# Patient Record
Sex: MALE | Race: WHITE | NOT HISPANIC OR LATINO | Employment: FULL TIME | URBAN - METROPOLITAN AREA
[De-identification: names, ages, dates, MRNs, and addresses within clinical notes are randomized per-mention and may not be internally consistent; named-entity substitution may affect disease eponyms.]

---

## 2018-07-23 ENCOUNTER — OFFICE VISIT (OUTPATIENT)
Dept: FAMILY MEDICINE CLINIC | Facility: CLINIC | Age: 23
End: 2018-07-23

## 2018-07-23 VITALS
WEIGHT: 140.2 LBS | RESPIRATION RATE: 16 BRPM | SYSTOLIC BLOOD PRESSURE: 90 MMHG | HEART RATE: 82 BPM | BODY MASS INDEX: 20.76 KG/M2 | HEIGHT: 69 IN | TEMPERATURE: 98.6 F | OXYGEN SATURATION: 98 % | DIASTOLIC BLOOD PRESSURE: 70 MMHG

## 2018-07-23 DIAGNOSIS — L84 FOOT CALLUS: ICD-10-CM

## 2018-07-23 DIAGNOSIS — R23.4 PEELING SKIN: ICD-10-CM

## 2018-07-23 DIAGNOSIS — Z76.89 ENCOUNTER TO ESTABLISH CARE: ICD-10-CM

## 2018-07-23 DIAGNOSIS — J45.30 MILD PERSISTENT ASTHMA, UNSPECIFIED WHETHER COMPLICATED: Primary | ICD-10-CM

## 2018-07-23 DIAGNOSIS — Z00.00 HEALTHCARE MAINTENANCE: ICD-10-CM

## 2018-07-23 LAB
ALBUMIN SERPL-MCNC: 4.8 G/DL (ref 3.5–5.2)
ALBUMIN/GLOB SERPL: 1.5 G/DL
ALP SERPL-CCNC: 84 U/L (ref 39–117)
ALT SERPL W P-5'-P-CCNC: 17 U/L (ref 1–41)
ANION GAP SERPL CALCULATED.3IONS-SCNC: 16.3 MMOL/L
AST SERPL-CCNC: 13 U/L (ref 1–40)
BILIRUB SERPL-MCNC: 0.7 MG/DL (ref 0.1–1.2)
BUN BLD-MCNC: 10 MG/DL (ref 6–20)
BUN/CREAT SERPL: 12.2 (ref 7–25)
CALCIUM SPEC-SCNC: 10.5 MG/DL (ref 8.6–10.5)
CHLORIDE SERPL-SCNC: 98 MMOL/L (ref 98–107)
CHOLEST SERPL-MCNC: 180 MG/DL (ref 0–200)
CO2 SERPL-SCNC: 26.7 MMOL/L (ref 22–29)
CREAT BLD-MCNC: 0.82 MG/DL (ref 0.76–1.27)
ERYTHROCYTE [DISTWIDTH] IN BLOOD BY AUTOMATED COUNT: 12.6 % (ref 4.5–15)
GFR SERPL CREATININE-BSD FRML MDRD: 120 ML/MIN/1.73
GLOBULIN UR ELPH-MCNC: 3.3 GM/DL
GLUCOSE BLD-MCNC: 87 MG/DL (ref 65–99)
HCT VFR BLD AUTO: 50.6 % (ref 35–60)
HDLC SERPL-MCNC: 55 MG/DL (ref 40–60)
HGB BLD-MCNC: 17.3 G/DL (ref 13.5–18)
LDLC SERPL CALC-MCNC: 105 MG/DL (ref 0–100)
LDLC/HDLC SERPL: 1.91 {RATIO}
LYMPHOCYTES # BLD AUTO: 2.5 10*3/MM3 (ref 1.2–3.4)
LYMPHOCYTES NFR BLD AUTO: 24.5 % (ref 21–51)
MCH RBC QN AUTO: 30.4 PG (ref 26.1–33.1)
MCHC RBC AUTO-ENTMCNC: 34.2 G/DL (ref 33–37)
MCV RBC AUTO: 88.8 FL (ref 80–99)
MONOCYTES # BLD AUTO: 0.5 10*3/MM3 (ref 0.1–0.6)
MONOCYTES NFR BLD AUTO: 5 % (ref 2–9)
NEUTROPHILS # BLD AUTO: 7.3 10*3/MM3 (ref 1.4–6.5)
NEUTROPHILS NFR BLD AUTO: 70.5 % (ref 42–75)
PLATELET # BLD AUTO: 412 10*3/MM3 (ref 150–450)
PMV BLD AUTO: 8.1 FL (ref 7.1–10.5)
POTASSIUM BLD-SCNC: 4.2 MMOL/L (ref 3.5–5.2)
PROT SERPL-MCNC: 8.1 G/DL (ref 6–8.5)
RBC # BLD AUTO: 5.7 10*6/MM3 (ref 4–6)
SODIUM BLD-SCNC: 141 MMOL/L (ref 136–145)
TRIGL SERPL-MCNC: 100 MG/DL (ref 0–150)
VLDLC SERPL-MCNC: 20 MG/DL (ref 5–40)
WBC NRBC COR # BLD: 10.3 10*3/MM3 (ref 4.5–10)

## 2018-07-23 PROCEDURE — 85025 COMPLETE CBC W/AUTO DIFF WBC: CPT | Performed by: NURSE PRACTITIONER

## 2018-07-23 PROCEDURE — 80061 LIPID PANEL: CPT | Performed by: NURSE PRACTITIONER

## 2018-07-23 PROCEDURE — 80053 COMPREHEN METABOLIC PANEL: CPT | Performed by: NURSE PRACTITIONER

## 2018-07-23 PROCEDURE — 36415 COLL VENOUS BLD VENIPUNCTURE: CPT | Performed by: NURSE PRACTITIONER

## 2018-07-23 PROCEDURE — 99203 OFFICE O/P NEW LOW 30 MIN: CPT | Performed by: NURSE PRACTITIONER

## 2018-07-23 RX ORDER — ALBUTEROL SULFATE 90 UG/1
2 AEROSOL, METERED RESPIRATORY (INHALATION) EVERY 4 HOURS PRN
Qty: 1 INHALER | Refills: 6 | Status: SHIPPED | OUTPATIENT
Start: 2018-07-23 | End: 2022-03-29 | Stop reason: SDUPTHER

## 2018-07-23 RX ORDER — MONTELUKAST SODIUM 10 MG/1
10 TABLET ORAL NIGHTLY
Qty: 30 TABLET | Refills: 11 | Status: SHIPPED | OUTPATIENT
Start: 2018-07-23 | End: 2022-03-29

## 2018-07-23 RX ORDER — CETIRIZINE HYDROCHLORIDE 10 MG/1
10 TABLET ORAL
COMMUNITY

## 2018-07-23 RX ORDER — ALBUTEROL SULFATE 90 UG/1
2 AEROSOL, METERED RESPIRATORY (INHALATION)
COMMUNITY
Start: 2012-09-04 | End: 2018-07-23 | Stop reason: SDUPTHER

## 2018-07-23 RX ORDER — CLOTRIMAZOLE AND BETAMETHASONE DIPROPIONATE 10; .64 MG/G; MG/G
CREAM TOPICAL EVERY 12 HOURS SCHEDULED
Qty: 15 G | Refills: 0 | Status: SHIPPED | OUTPATIENT
Start: 2018-07-23 | End: 2022-03-29

## 2018-07-24 ENCOUNTER — TELEPHONE (OUTPATIENT)
Dept: FAMILY MEDICINE CLINIC | Facility: CLINIC | Age: 23
End: 2018-07-24

## 2019-11-20 ENCOUNTER — APPOINTMENT (OUTPATIENT)
Dept: CT IMAGING | Facility: HOSPITAL | Age: 24
End: 2019-11-20

## 2019-11-20 ENCOUNTER — APPOINTMENT (OUTPATIENT)
Dept: GENERAL RADIOLOGY | Facility: HOSPITAL | Age: 24
End: 2019-11-20

## 2019-11-20 ENCOUNTER — HOSPITAL ENCOUNTER (EMERGENCY)
Facility: HOSPITAL | Age: 24
Discharge: HOME | End: 2019-11-20
Attending: EMERGENCY MEDICINE | Admitting: EMERGENCY MEDICINE

## 2019-11-20 VITALS
HEART RATE: 89 BPM | WEIGHT: 150 LBS | SYSTOLIC BLOOD PRESSURE: 132 MMHG | TEMPERATURE: 98 F | RESPIRATION RATE: 16 BRPM | HEIGHT: 70 IN | BODY MASS INDEX: 21.47 KG/M2 | OXYGEN SATURATION: 98 % | DIASTOLIC BLOOD PRESSURE: 72 MMHG

## 2019-11-20 DIAGNOSIS — K52.9 COLITIS: ICD-10-CM

## 2019-11-20 DIAGNOSIS — E86.0 DEHYDRATION: ICD-10-CM

## 2019-11-20 DIAGNOSIS — R19.7 DIARRHEA, UNSPECIFIED TYPE: ICD-10-CM

## 2019-11-20 DIAGNOSIS — B34.0 ADENOVIRUS INFECTION: Primary | ICD-10-CM

## 2019-11-20 LAB
ALBUMIN SERPL-MCNC: 4.3 G/DL (ref 3.5–5.2)
ALBUMIN/GLOB SERPL: 1 G/DL
ALP SERPL-CCNC: 75 U/L (ref 39–117)
ALT SERPL W P-5'-P-CCNC: 10 U/L (ref 1–41)
ANION GAP SERPL CALCULATED.3IONS-SCNC: 12.8 MMOL/L (ref 5–15)
AST SERPL-CCNC: 15 U/L (ref 1–40)
B PARAPERT DNA SPEC QL NAA+PROBE: NOT DETECTED
B PERT DNA SPEC QL NAA+PROBE: NOT DETECTED
BASOPHILS # BLD AUTO: 0.03 10*3/MM3 (ref 0–0.2)
BASOPHILS NFR BLD AUTO: 0.4 % (ref 0–1.5)
BILIRUB SERPL-MCNC: 0.4 MG/DL (ref 0.2–1.2)
BILIRUB UR QL STRIP: NEGATIVE
BUN BLD-MCNC: 10 MG/DL (ref 6–20)
BUN/CREAT SERPL: 12 (ref 7–25)
C PNEUM DNA NPH QL NAA+NON-PROBE: NOT DETECTED
CALCIUM SPEC-SCNC: 9.7 MG/DL (ref 8.6–10.5)
CHLORIDE SERPL-SCNC: 98 MMOL/L (ref 98–107)
CK SERPL-CCNC: 40 U/L (ref 20–200)
CLARITY UR: CLEAR
CO2 SERPL-SCNC: 26.2 MMOL/L (ref 22–29)
COLOR UR: ABNORMAL
CREAT BLD-MCNC: 0.83 MG/DL (ref 0.76–1.27)
DEPRECATED RDW RBC AUTO: 39.2 FL (ref 37–54)
EOSINOPHIL # BLD AUTO: 0.2 10*3/MM3 (ref 0–0.4)
EOSINOPHIL NFR BLD AUTO: 2.7 % (ref 0.3–6.2)
ERYTHROCYTE [DISTWIDTH] IN BLOOD BY AUTOMATED COUNT: 12 % (ref 12.3–15.4)
FLUAV H1 2009 PAND RNA NPH QL NAA+PROBE: NOT DETECTED
FLUAV H1 HA GENE NPH QL NAA+PROBE: NOT DETECTED
FLUAV H3 RNA NPH QL NAA+PROBE: NOT DETECTED
FLUAV SUBTYP SPEC NAA+PROBE: NOT DETECTED
FLUBV RNA ISLT QL NAA+PROBE: NOT DETECTED
GFR SERPL CREATININE-BSD FRML MDRD: 117 ML/MIN/1.73
GLOBULIN UR ELPH-MCNC: 4.1 GM/DL
GLUCOSE BLD-MCNC: 102 MG/DL (ref 65–99)
GLUCOSE UR STRIP-MCNC: NEGATIVE MG/DL
HADV DNA SPEC NAA+PROBE: DETECTED
HCOV 229E RNA SPEC QL NAA+PROBE: NOT DETECTED
HCOV HKU1 RNA SPEC QL NAA+PROBE: NOT DETECTED
HCOV NL63 RNA SPEC QL NAA+PROBE: NOT DETECTED
HCOV OC43 RNA SPEC QL NAA+PROBE: NOT DETECTED
HCT VFR BLD AUTO: 49.2 % (ref 37.5–51)
HGB BLD-MCNC: 17.3 G/DL (ref 13–17.7)
HGB UR QL STRIP.AUTO: NEGATIVE
HMPV RNA NPH QL NAA+NON-PROBE: NOT DETECTED
HPIV1 RNA SPEC QL NAA+PROBE: NOT DETECTED
HPIV2 RNA SPEC QL NAA+PROBE: NOT DETECTED
HPIV3 RNA NPH QL NAA+PROBE: NOT DETECTED
HPIV4 P GENE NPH QL NAA+PROBE: NOT DETECTED
IMM GRANULOCYTES # BLD AUTO: 0.03 10*3/MM3 (ref 0–0.05)
IMM GRANULOCYTES NFR BLD AUTO: 0.4 % (ref 0–0.5)
KETONES UR QL STRIP: ABNORMAL
LEUKOCYTE ESTERASE UR QL STRIP.AUTO: NEGATIVE
LIPASE SERPL-CCNC: 10 U/L (ref 13–60)
LYMPHOCYTES # BLD AUTO: 1.8 10*3/MM3 (ref 0.7–3.1)
LYMPHOCYTES NFR BLD AUTO: 24.4 % (ref 19.6–45.3)
M PNEUMO IGG SER IA-ACNC: NOT DETECTED
MCH RBC QN AUTO: 31.5 PG (ref 26.6–33)
MCHC RBC AUTO-ENTMCNC: 35.2 G/DL (ref 31.5–35.7)
MCV RBC AUTO: 89.6 FL (ref 79–97)
MONOCYTES # BLD AUTO: 1.03 10*3/MM3 (ref 0.1–0.9)
MONOCYTES NFR BLD AUTO: 14 % (ref 5–12)
NEUTROPHILS # BLD AUTO: 4.29 10*3/MM3 (ref 1.7–7)
NEUTROPHILS NFR BLD AUTO: 58.1 % (ref 42.7–76)
NITRITE UR QL STRIP: NEGATIVE
NRBC BLD AUTO-RTO: 0 /100 WBC (ref 0–0.2)
PH UR STRIP.AUTO: 6 [PH] (ref 5–8)
PLATELET # BLD AUTO: 365 10*3/MM3 (ref 140–450)
PMV BLD AUTO: 10.1 FL (ref 6–12)
POTASSIUM BLD-SCNC: 3.8 MMOL/L (ref 3.5–5.2)
PROCALCITONIN SERPL-MCNC: 0.06 NG/ML (ref 0.1–0.25)
PROT SERPL-MCNC: 8.4 G/DL (ref 6–8.5)
PROT UR QL STRIP: ABNORMAL
RBC # BLD AUTO: 5.49 10*6/MM3 (ref 4.14–5.8)
RHINOVIRUS RNA SPEC NAA+PROBE: NOT DETECTED
RSV RNA NPH QL NAA+NON-PROBE: NOT DETECTED
S PYO AG THROAT QL: NEGATIVE
SODIUM BLD-SCNC: 137 MMOL/L (ref 136–145)
SP GR UR STRIP: >=1.03 (ref 1–1.03)
UROBILINOGEN UR QL STRIP: ABNORMAL
WBC NRBC COR # BLD: 7.38 10*3/MM3 (ref 3.4–10.8)

## 2019-11-20 PROCEDURE — 87081 CULTURE SCREEN ONLY: CPT | Performed by: EMERGENCY MEDICINE

## 2019-11-20 PROCEDURE — 82550 ASSAY OF CK (CPK): CPT | Performed by: EMERGENCY MEDICINE

## 2019-11-20 PROCEDURE — 80053 COMPREHEN METABOLIC PANEL: CPT | Performed by: EMERGENCY MEDICINE

## 2019-11-20 PROCEDURE — 0100U HC BIOFIRE FILMARRAY RESP PANEL 2: CPT | Performed by: EMERGENCY MEDICINE

## 2019-11-20 PROCEDURE — 99283 EMERGENCY DEPT VISIT LOW MDM: CPT

## 2019-11-20 PROCEDURE — 25010000002 IOPAMIDOL 61 % SOLUTION: Performed by: EMERGENCY MEDICINE

## 2019-11-20 PROCEDURE — 74022 RADEX COMPL AQT ABD SERIES: CPT

## 2019-11-20 PROCEDURE — 83690 ASSAY OF LIPASE: CPT | Performed by: EMERGENCY MEDICINE

## 2019-11-20 PROCEDURE — 85025 COMPLETE CBC W/AUTO DIFF WBC: CPT | Performed by: EMERGENCY MEDICINE

## 2019-11-20 PROCEDURE — 74177 CT ABD & PELVIS W/CONTRAST: CPT

## 2019-11-20 PROCEDURE — 84145 PROCALCITONIN (PCT): CPT | Performed by: EMERGENCY MEDICINE

## 2019-11-20 PROCEDURE — 81003 URINALYSIS AUTO W/O SCOPE: CPT | Performed by: EMERGENCY MEDICINE

## 2019-11-20 PROCEDURE — 87880 STREP A ASSAY W/OPTIC: CPT | Performed by: EMERGENCY MEDICINE

## 2019-11-20 RX ORDER — ONDANSETRON 4 MG/1
4 TABLET, FILM COATED ORAL EVERY 6 HOURS
Qty: 12 TABLET | Refills: 0 | Status: SHIPPED | OUTPATIENT
Start: 2019-11-20 | End: 2022-03-29

## 2019-11-20 RX ORDER — METRONIDAZOLE 500 MG/1
500 TABLET ORAL 3 TIMES DAILY
Qty: 21 TABLET | Refills: 0 | Status: SHIPPED | OUTPATIENT
Start: 2019-11-20 | End: 2022-03-29

## 2019-11-20 RX ORDER — CIPROFLOXACIN 500 MG/1
500 TABLET, FILM COATED ORAL 2 TIMES DAILY
Qty: 14 TABLET | Refills: 0 | Status: SHIPPED | OUTPATIENT
Start: 2019-11-20 | End: 2022-03-29

## 2019-11-20 RX ADMIN — SODIUM CHLORIDE 1000 ML: 9 INJECTION, SOLUTION INTRAVENOUS at 16:39

## 2019-11-20 RX ADMIN — IOPAMIDOL 95 ML: 612 INJECTION, SOLUTION INTRAVENOUS at 17:43

## 2019-11-22 LAB — BACTERIA SPEC AEROBE CULT: NORMAL

## 2022-03-29 ENCOUNTER — OFFICE VISIT (OUTPATIENT)
Dept: INTERNAL MEDICINE | Facility: CLINIC | Age: 27
End: 2022-03-29

## 2022-03-29 VITALS
BODY MASS INDEX: 22.76 KG/M2 | SYSTOLIC BLOOD PRESSURE: 104 MMHG | DIASTOLIC BLOOD PRESSURE: 68 MMHG | WEIGHT: 159 LBS | OXYGEN SATURATION: 99 % | HEIGHT: 70 IN | HEART RATE: 126 BPM

## 2022-03-29 DIAGNOSIS — J45.20 MILD INTERMITTENT ASTHMA WITHOUT COMPLICATION: ICD-10-CM

## 2022-03-29 DIAGNOSIS — R06.83 SNORING: ICD-10-CM

## 2022-03-29 DIAGNOSIS — Z00.00 HEALTHCARE MAINTENANCE: Primary | ICD-10-CM

## 2022-03-29 PROBLEM — J30.9 ALLERGIC RHINITIS: Status: ACTIVE | Noted: 2022-03-29

## 2022-03-29 PROCEDURE — 99385 PREV VISIT NEW AGE 18-39: CPT | Performed by: NURSE PRACTITIONER

## 2022-03-29 PROCEDURE — 99203 OFFICE O/P NEW LOW 30 MIN: CPT | Performed by: NURSE PRACTITIONER

## 2022-03-29 RX ORDER — DEXAMETHASONE 4 MG/1
1 TABLET ORAL
Qty: 12 G | Refills: 11 | Status: SHIPPED | OUTPATIENT
Start: 2022-03-29

## 2022-03-29 RX ORDER — ALBUTEROL SULFATE 90 UG/1
2 AEROSOL, METERED RESPIRATORY (INHALATION) EVERY 4 HOURS PRN
Qty: 8 G | Refills: 3 | Status: SHIPPED | OUTPATIENT
Start: 2022-03-29

## 2022-06-15 ENCOUNTER — APPOINTMENT (OUTPATIENT)
Dept: GENERAL RADIOLOGY | Facility: HOSPITAL | Age: 27
End: 2022-06-15

## 2022-06-15 ENCOUNTER — HOSPITAL ENCOUNTER (EMERGENCY)
Facility: HOSPITAL | Age: 27
Discharge: HOME | End: 2022-06-16
Attending: EMERGENCY MEDICINE | Admitting: EMERGENCY MEDICINE

## 2022-06-15 DIAGNOSIS — J45.41 MODERATE PERSISTENT ASTHMA WITH ACUTE EXACERBATION: Primary | ICD-10-CM

## 2022-06-15 LAB
ALBUMIN SERPL-MCNC: 4 G/DL (ref 3.5–5.2)
ALBUMIN/GLOB SERPL: 1.3 G/DL
ALP SERPL-CCNC: 107 U/L (ref 39–117)
ALT SERPL W P-5'-P-CCNC: 12 U/L (ref 1–41)
ANION GAP SERPL CALCULATED.3IONS-SCNC: 12.1 MMOL/L (ref 5–15)
AST SERPL-CCNC: 14 U/L (ref 1–40)
BASOPHILS # BLD AUTO: 0.02 10*3/MM3 (ref 0–0.2)
BASOPHILS NFR BLD AUTO: 0.2 % (ref 0–1.5)
BILIRUB SERPL-MCNC: 0.3 MG/DL (ref 0–1.2)
BUN SERPL-MCNC: 10 MG/DL (ref 6–20)
BUN/CREAT SERPL: 14.1 (ref 7–25)
CALCIUM SPEC-SCNC: 9.1 MG/DL (ref 8.6–10.5)
CHLORIDE SERPL-SCNC: 105 MMOL/L (ref 98–107)
CO2 SERPL-SCNC: 24.9 MMOL/L (ref 22–29)
CREAT SERPL-MCNC: 0.71 MG/DL (ref 0.76–1.27)
DEPRECATED RDW RBC AUTO: 38.2 FL (ref 37–54)
EGFRCR SERPLBLD CKD-EPI 2021: 131.4 ML/MIN/1.73
EOSINOPHIL # BLD AUTO: 0.44 10*3/MM3 (ref 0–0.4)
EOSINOPHIL NFR BLD AUTO: 3.8 % (ref 0.3–6.2)
ERYTHROCYTE [DISTWIDTH] IN BLOOD BY AUTOMATED COUNT: 11.9 % (ref 12.3–15.4)
GLOBULIN UR ELPH-MCNC: 3.2 GM/DL
GLUCOSE SERPL-MCNC: 93 MG/DL (ref 65–99)
HCT VFR BLD AUTO: 42.8 % (ref 37.5–51)
HGB BLD-MCNC: 14.7 G/DL (ref 13–17.7)
IMM GRANULOCYTES # BLD AUTO: 0.03 10*3/MM3 (ref 0–0.05)
IMM GRANULOCYTES NFR BLD AUTO: 0.3 % (ref 0–0.5)
LYMPHOCYTES # BLD AUTO: 1.75 10*3/MM3 (ref 0.7–3.1)
LYMPHOCYTES NFR BLD AUTO: 15.1 % (ref 19.6–45.3)
MCH RBC QN AUTO: 30.6 PG (ref 26.6–33)
MCHC RBC AUTO-ENTMCNC: 34.3 G/DL (ref 31.5–35.7)
MCV RBC AUTO: 89.2 FL (ref 79–97)
MONOCYTES # BLD AUTO: 0.9 10*3/MM3 (ref 0.1–0.9)
MONOCYTES NFR BLD AUTO: 7.8 % (ref 5–12)
NEUTROPHILS NFR BLD AUTO: 72.8 % (ref 42.7–76)
NEUTROPHILS NFR BLD AUTO: 8.42 10*3/MM3 (ref 1.7–7)
NRBC BLD AUTO-RTO: 0 /100 WBC (ref 0–0.2)
PLATELET # BLD AUTO: 346 10*3/MM3 (ref 140–450)
PMV BLD AUTO: 9.9 FL (ref 6–12)
POTASSIUM SERPL-SCNC: 3.6 MMOL/L (ref 3.5–5.2)
PROT SERPL-MCNC: 7.2 G/DL (ref 6–8.5)
RBC # BLD AUTO: 4.8 10*6/MM3 (ref 4.14–5.8)
SARS-COV-2 RNA RESP QL NAA+PROBE: NOT DETECTED
SODIUM SERPL-SCNC: 142 MMOL/L (ref 136–145)
WBC NRBC COR # BLD: 11.56 10*3/MM3 (ref 3.4–10.8)

## 2022-06-15 PROCEDURE — 94664 DEMO&/EVAL PT USE INHALER: CPT

## 2022-06-15 PROCEDURE — 63710000001 PREDNISONE PER 1 MG: Performed by: EMERGENCY MEDICINE

## 2022-06-15 PROCEDURE — 94799 UNLISTED PULMONARY SVC/PX: CPT

## 2022-06-15 PROCEDURE — 84484 ASSAY OF TROPONIN QUANT: CPT | Performed by: EMERGENCY MEDICINE

## 2022-06-15 PROCEDURE — 71045 X-RAY EXAM CHEST 1 VIEW: CPT

## 2022-06-15 PROCEDURE — 84145 PROCALCITONIN (PCT): CPT | Performed by: EMERGENCY MEDICINE

## 2022-06-15 PROCEDURE — 85025 COMPLETE CBC W/AUTO DIFF WBC: CPT | Performed by: EMERGENCY MEDICINE

## 2022-06-15 PROCEDURE — 96361 HYDRATE IV INFUSION ADD-ON: CPT

## 2022-06-15 PROCEDURE — 94640 AIRWAY INHALATION TREATMENT: CPT

## 2022-06-15 PROCEDURE — 93005 ELECTROCARDIOGRAM TRACING: CPT | Performed by: EMERGENCY MEDICINE

## 2022-06-15 PROCEDURE — 99284 EMERGENCY DEPT VISIT MOD MDM: CPT

## 2022-06-15 PROCEDURE — 25010000002 MAGNESIUM SULFATE 2 GM/50ML SOLUTION: Performed by: EMERGENCY MEDICINE

## 2022-06-15 PROCEDURE — U0003 INFECTIOUS AGENT DETECTION BY NUCLEIC ACID (DNA OR RNA); SEVERE ACUTE RESPIRATORY SYNDROME CORONAVIRUS 2 (SARS-COV-2) (CORONAVIRUS DISEASE [COVID-19]), AMPLIFIED PROBE TECHNIQUE, MAKING USE OF HIGH THROUGHPUT TECHNOLOGIES AS DESCRIBED BY CMS-2020-01-R: HCPCS | Performed by: EMERGENCY MEDICINE

## 2022-06-15 PROCEDURE — 80053 COMPREHEN METABOLIC PANEL: CPT | Performed by: EMERGENCY MEDICINE

## 2022-06-15 PROCEDURE — 96365 THER/PROPH/DIAG IV INF INIT: CPT

## 2022-06-15 PROCEDURE — 93010 ELECTROCARDIOGRAM REPORT: CPT | Performed by: INTERNAL MEDICINE

## 2022-06-15 RX ORDER — ALBUTEROL SULFATE 2.5 MG/3ML
2.5 SOLUTION RESPIRATORY (INHALATION)
Status: DISPENSED | OUTPATIENT
Start: 2022-06-15 | End: 2022-06-15

## 2022-06-15 RX ORDER — MAGNESIUM SULFATE HEPTAHYDRATE 40 MG/ML
2 INJECTION, SOLUTION INTRAVENOUS ONCE
Status: COMPLETED | OUTPATIENT
Start: 2022-06-15 | End: 2022-06-15

## 2022-06-15 RX ORDER — PREDNISONE 20 MG/1
60 TABLET ORAL ONCE
Status: COMPLETED | OUTPATIENT
Start: 2022-06-15 | End: 2022-06-15

## 2022-06-15 RX ORDER — IPRATROPIUM BROMIDE AND ALBUTEROL SULFATE 2.5; .5 MG/3ML; MG/3ML
3 SOLUTION RESPIRATORY (INHALATION) ONCE
Status: COMPLETED | OUTPATIENT
Start: 2022-06-15 | End: 2022-06-15

## 2022-06-15 RX ADMIN — IPRATROPIUM BROMIDE AND ALBUTEROL SULFATE 3 ML: .5; 3 SOLUTION RESPIRATORY (INHALATION) at 23:17

## 2022-06-15 RX ADMIN — SODIUM CHLORIDE 1000 ML: 9 INJECTION, SOLUTION INTRAVENOUS at 22:55

## 2022-06-15 RX ADMIN — PREDNISONE 60 MG: 20 TABLET ORAL at 22:59

## 2022-06-15 RX ADMIN — ALBUTEROL SULFATE 2.5 MG: 2.5 SOLUTION RESPIRATORY (INHALATION) at 23:27

## 2022-06-15 RX ADMIN — ALBUTEROL SULFATE 2.5 MG: 2.5 SOLUTION RESPIRATORY (INHALATION) at 23:46

## 2022-06-15 RX ADMIN — MAGNESIUM SULFATE HEPTAHYDRATE 2 G: 2 INJECTION, SOLUTION INTRAVENOUS at 22:57

## 2022-06-16 VITALS
HEART RATE: 117 BPM | TEMPERATURE: 98.1 F | DIASTOLIC BLOOD PRESSURE: 73 MMHG | OXYGEN SATURATION: 98 % | RESPIRATION RATE: 18 BRPM | SYSTOLIC BLOOD PRESSURE: 119 MMHG

## 2022-06-16 LAB
PROCALCITONIN SERPL-MCNC: 0.09 NG/ML (ref 0–0.25)
QT INTERVAL: 318 MS
TROPONIN T SERPL-MCNC: <0.01 NG/ML (ref 0–0.03)

## 2022-06-16 RX ORDER — PREDNISONE 20 MG/1
40 TABLET ORAL DAILY
Qty: 10 TABLET | Refills: 0 | Status: SHIPPED | OUTPATIENT
Start: 2022-06-16 | End: 2022-06-21

## 2023-09-19 ENCOUNTER — HOSPITAL ENCOUNTER (EMERGENCY)
Facility: HOSPITAL | Age: 28
Discharge: LEFT BEFORE DISCHARGE | End: 2023-09-19
Attending: EMERGENCY MEDICINE | Admitting: EMERGENCY MEDICINE

## 2023-09-19 VITALS
TEMPERATURE: 98.1 F | HEIGHT: 70 IN | HEART RATE: 96 BPM | SYSTOLIC BLOOD PRESSURE: 139 MMHG | BODY MASS INDEX: 21.47 KG/M2 | WEIGHT: 150 LBS | OXYGEN SATURATION: 96 % | DIASTOLIC BLOOD PRESSURE: 76 MMHG | RESPIRATION RATE: 18 BRPM

## 2023-09-19 DIAGNOSIS — Z53.29 REFUSAL OF CARE BY PATIENT: Primary | ICD-10-CM
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
## 2023-09-19 DIAGNOSIS — M65.10 INFECTIOUS TENOSYNOVITIS: ICD-10-CM

## 2023-09-19 DIAGNOSIS — S61.459A DOG BITE OF HAND, UNSPECIFIED LATERALITY, INITIAL ENCOUNTER: ICD-10-CM

## 2023-09-19 PROCEDURE — 99282 EMERGENCY DEPT VISIT SF MDM: CPT

## 2023-09-19 RX ORDER — ALBUTEROL SULFATE 90 UG/1
2 AEROSOL, METERED RESPIRATORY (INHALATION) EVERY 4 HOURS PRN
Qty: 8 G | Refills: 0 | Status: SHIPPED | OUTPATIENT
Start: 2023-09-19

## 2023-09-19 RX ORDER — DOXYCYCLINE 100 MG/1
100 CAPSULE ORAL 2 TIMES DAILY
Qty: 28 CAPSULE | Refills: 0 | Status: SHIPPED | OUTPATIENT
Start: 2023-09-19 | End: 2023-10-03

## 2023-09-19 RX ORDER — HYDROCODONE BITARTRATE AND ACETAMINOPHEN 5; 325 MG/1; MG/1
1 TABLET ORAL EVERY 6 HOURS PRN
Qty: 10 TABLET | Refills: 0 | Status: SHIPPED | OUTPATIENT
Start: 2023-09-19

## 2025-03-10 ENCOUNTER — APPOINTMENT (OUTPATIENT)
Dept: GENERAL RADIOLOGY | Facility: HOSPITAL | Age: 30
End: 2025-03-10
Payer: COMMERCIAL

## 2025-03-10 ENCOUNTER — APPOINTMENT (OUTPATIENT)
Dept: CT IMAGING | Facility: HOSPITAL | Age: 30
End: 2025-03-10
Payer: COMMERCIAL

## 2025-03-10 ENCOUNTER — HOSPITAL ENCOUNTER (OUTPATIENT)
Facility: HOSPITAL | Age: 30
Setting detail: OBSERVATION
Discharge: HOME | End: 2025-03-13
Attending: EMERGENCY MEDICINE | Admitting: STUDENT IN AN ORGANIZED HEALTH CARE EDUCATION/TRAINING PROGRAM
Payer: COMMERCIAL

## 2025-03-10 DIAGNOSIS — R19.7 NAUSEA VOMITING AND DIARRHEA: ICD-10-CM

## 2025-03-10 DIAGNOSIS — R11.2 NAUSEA VOMITING AND DIARRHEA: ICD-10-CM

## 2025-03-10 DIAGNOSIS — J18.9 MULTIFOCAL PNEUMONIA: Primary | ICD-10-CM

## 2025-03-10 PROBLEM — A41.9 SEPSIS: Status: ACTIVE | Noted: 2025-03-10

## 2025-03-10 PROBLEM — E87.6 HYPOKALEMIA: Status: ACTIVE | Noted: 2025-03-10

## 2025-03-10 PROBLEM — E87.1 HYPONATREMIA: Status: ACTIVE | Noted: 2025-03-10

## 2025-03-10 LAB
ADV 40+41 DNA STL QL NAA+NON-PROBE: NOT DETECTED
ALBUMIN SERPL-MCNC: 4.2 G/DL (ref 3.5–5.2)
ALBUMIN/GLOB SERPL: 1 G/DL
ALP SERPL-CCNC: 91 U/L (ref 39–117)
ALT SERPL W P-5'-P-CCNC: 27 U/L (ref 1–41)
ANION GAP SERPL CALCULATED.3IONS-SCNC: 17.7 MMOL/L (ref 5–15)
AST SERPL-CCNC: 20 U/L (ref 1–40)
ASTRO TYP 1-8 RNA STL QL NAA+NON-PROBE: NOT DETECTED
B PARAPERT DNA SPEC QL NAA+PROBE: NOT DETECTED
B PERT DNA SPEC QL NAA+PROBE: NOT DETECTED
BACTERIA UR QL AUTO: ABNORMAL /HPF
BASOPHILS # BLD AUTO: 0.03 10*3/MM3 (ref 0–0.2)
BASOPHILS NFR BLD AUTO: 0.2 % (ref 0–1.5)
BILIRUB SERPL-MCNC: 1 MG/DL (ref 0–1.2)
BILIRUB UR QL STRIP: NEGATIVE
BUN SERPL-MCNC: 6 MG/DL (ref 6–20)
BUN/CREAT SERPL: 7.4 (ref 7–25)
C CAYETANENSIS DNA STL QL NAA+NON-PROBE: NOT DETECTED
C COLI+JEJ+UPSA DNA STL QL NAA+NON-PROBE: NOT DETECTED
C PNEUM DNA NPH QL NAA+NON-PROBE: NOT DETECTED
CALCIUM SPEC-SCNC: 9.4 MG/DL (ref 8.6–10.5)
CHLORIDE SERPL-SCNC: 93 MMOL/L (ref 98–107)
CLARITY UR: CLEAR
CO2 SERPL-SCNC: 21.3 MMOL/L (ref 22–29)
COLOR UR: ABNORMAL
CREAT SERPL-MCNC: 0.81 MG/DL (ref 0.76–1.27)
CRYPTOSP DNA STL QL NAA+NON-PROBE: NOT DETECTED
D-LACTATE SERPL-SCNC: 1.7 MMOL/L (ref 0.5–2)
DEPRECATED RDW RBC AUTO: 39.9 FL (ref 37–54)
E HISTOLYT DNA STL QL NAA+NON-PROBE: NOT DETECTED
EAEC PAA PLAS AGGR+AATA ST NAA+NON-PRB: NOT DETECTED
EC STX1+STX2 GENES STL QL NAA+NON-PROBE: NOT DETECTED
EGFRCR SERPLBLD CKD-EPI 2021: 123.9 ML/MIN/1.73
EOSINOPHIL # BLD AUTO: 0.01 10*3/MM3 (ref 0–0.4)
EOSINOPHIL NFR BLD AUTO: 0.1 % (ref 0.3–6.2)
EPEC EAE GENE STL QL NAA+NON-PROBE: NOT DETECTED
ERYTHROCYTE [DISTWIDTH] IN BLOOD BY AUTOMATED COUNT: 12.1 % (ref 12.3–15.4)
ETEC LTA+ST1A+ST1B TOX ST NAA+NON-PROBE: NOT DETECTED
FLUAV SUBTYP SPEC NAA+PROBE: NOT DETECTED
FLUBV RNA ISLT QL NAA+PROBE: NOT DETECTED
G LAMBLIA DNA STL QL NAA+NON-PROBE: NOT DETECTED
GLOBULIN UR ELPH-MCNC: 4.1 GM/DL
GLUCOSE SERPL-MCNC: 102 MG/DL (ref 65–99)
GLUCOSE UR STRIP-MCNC: NEGATIVE MG/DL
HADV DNA SPEC NAA+PROBE: NOT DETECTED
HCOV 229E RNA SPEC QL NAA+PROBE: NOT DETECTED
HCOV HKU1 RNA SPEC QL NAA+PROBE: NOT DETECTED
HCOV NL63 RNA SPEC QL NAA+PROBE: NOT DETECTED
HCOV OC43 RNA SPEC QL NAA+PROBE: NOT DETECTED
HCT VFR BLD AUTO: 45.2 % (ref 37.5–51)
HGB BLD-MCNC: 15.2 G/DL (ref 13–17.7)
HGB UR QL STRIP.AUTO: NEGATIVE
HMPV RNA NPH QL NAA+NON-PROBE: NOT DETECTED
HOLD SPECIMEN: NORMAL
HOLD SPECIMEN: NORMAL
HPIV1 RNA ISLT QL NAA+PROBE: NOT DETECTED
HPIV2 RNA SPEC QL NAA+PROBE: NOT DETECTED
HPIV3 RNA NPH QL NAA+PROBE: NOT DETECTED
HPIV4 P GENE NPH QL NAA+PROBE: NOT DETECTED
HYALINE CASTS UR QL AUTO: ABNORMAL /LPF
IMM GRANULOCYTES # BLD AUTO: 0.2 10*3/MM3 (ref 0–0.05)
IMM GRANULOCYTES NFR BLD AUTO: 1 % (ref 0–0.5)
KETONES UR QL STRIP: ABNORMAL
L PNEUMO1 AG UR QL IA: NEGATIVE
LEUKOCYTE ESTERASE UR QL STRIP.AUTO: NEGATIVE
LIPASE SERPL-CCNC: 10 U/L (ref 13–60)
LYMPHOCYTES # BLD AUTO: 1.36 10*3/MM3 (ref 0.7–3.1)
LYMPHOCYTES NFR BLD AUTO: 7 % (ref 19.6–45.3)
M PNEUMO IGG SER IA-ACNC: NOT DETECTED
MCH RBC QN AUTO: 29.9 PG (ref 26.6–33)
MCHC RBC AUTO-ENTMCNC: 33.6 G/DL (ref 31.5–35.7)
MCV RBC AUTO: 88.8 FL (ref 79–97)
MONOCYTES # BLD AUTO: 2.65 10*3/MM3 (ref 0.1–0.9)
MONOCYTES NFR BLD AUTO: 13.7 % (ref 5–12)
NEUTROPHILS NFR BLD AUTO: 15.16 10*3/MM3 (ref 1.7–7)
NEUTROPHILS NFR BLD AUTO: 78 % (ref 42.7–76)
NITRITE UR QL STRIP: NEGATIVE
NOROVIRUS GI+II RNA STL QL NAA+NON-PROBE: NOT DETECTED
NRBC BLD AUTO-RTO: 0 /100 WBC (ref 0–0.2)
P SHIGELLOIDES DNA STL QL NAA+NON-PROBE: NOT DETECTED
PH UR STRIP.AUTO: 5.5 [PH] (ref 5–8)
PLATELET # BLD AUTO: 471 10*3/MM3 (ref 140–450)
PMV BLD AUTO: 11 FL (ref 6–12)
POTASSIUM SERPL-SCNC: 3.2 MMOL/L (ref 3.5–5.2)
PROCALCITONIN SERPL-MCNC: 0.1 NG/ML (ref 0–0.25)
PROT SERPL-MCNC: 8.3 G/DL (ref 6–8.5)
PROT UR QL STRIP: ABNORMAL
RBC # BLD AUTO: 5.09 10*6/MM3 (ref 4.14–5.8)
RBC # UR STRIP: ABNORMAL /HPF
REF LAB TEST METHOD: ABNORMAL
RHINOVIRUS RNA SPEC NAA+PROBE: NOT DETECTED
RSV RNA NPH QL NAA+NON-PROBE: NOT DETECTED
RVA RNA STL QL NAA+NON-PROBE: NOT DETECTED
S ENT+BONG DNA STL QL NAA+NON-PROBE: NOT DETECTED
S PNEUM AG SPEC QL LA: NEGATIVE
SAPO I+II+IV+V RNA STL QL NAA+NON-PROBE: NOT DETECTED
SARS-COV-2 RNA NPH QL NAA+NON-PROBE: NOT DETECTED
SHIGELLA SP+EIEC IPAH ST NAA+NON-PROBE: NOT DETECTED
SODIUM SERPL-SCNC: 132 MMOL/L (ref 136–145)
SP GR UR STRIP: 1.02 (ref 1–1.03)
SQUAMOUS #/AREA URNS HPF: ABNORMAL /HPF
UROBILINOGEN UR QL STRIP: ABNORMAL
V CHOL+PARA+VUL DNA STL QL NAA+NON-PROBE: NOT DETECTED
V CHOLERAE DNA STL QL NAA+NON-PROBE: NOT DETECTED
WBC # UR STRIP: ABNORMAL /HPF
WBC NRBC COR # BLD AUTO: 19.41 10*3/MM3 (ref 3.4–10.8)
WHOLE BLOOD HOLD COAG: NORMAL
WHOLE BLOOD HOLD SPECIMEN: NORMAL
Y ENTEROCOL DNA STL QL NAA+NON-PROBE: NOT DETECTED

## 2025-03-10 PROCEDURE — 80053 COMPREHEN METABOLIC PANEL: CPT | Performed by: EMERGENCY MEDICINE

## 2025-03-10 PROCEDURE — 96375 TX/PRO/DX INJ NEW DRUG ADDON: CPT

## 2025-03-10 PROCEDURE — 99285 EMERGENCY DEPT VISIT HI MDM: CPT

## 2025-03-10 PROCEDURE — 87507 IADNA-DNA/RNA PROBE TQ 12-25: CPT | Performed by: STUDENT IN AN ORGANIZED HEALTH CARE EDUCATION/TRAINING PROGRAM

## 2025-03-10 PROCEDURE — 71046 X-RAY EXAM CHEST 2 VIEWS: CPT

## 2025-03-10 PROCEDURE — 83690 ASSAY OF LIPASE: CPT | Performed by: EMERGENCY MEDICINE

## 2025-03-10 PROCEDURE — 83605 ASSAY OF LACTIC ACID: CPT | Performed by: EMERGENCY MEDICINE

## 2025-03-10 PROCEDURE — 96361 HYDRATE IV INFUSION ADD-ON: CPT

## 2025-03-10 PROCEDURE — 81001 URINALYSIS AUTO W/SCOPE: CPT

## 2025-03-10 PROCEDURE — 25510000001 IOPAMIDOL 61 % SOLUTION: Performed by: EMERGENCY MEDICINE

## 2025-03-10 PROCEDURE — 25810000003 LACTATED RINGERS PER 1000 ML: Performed by: STUDENT IN AN ORGANIZED HEALTH CARE EDUCATION/TRAINING PROGRAM

## 2025-03-10 PROCEDURE — G0378 HOSPITAL OBSERVATION PER HR: HCPCS

## 2025-03-10 PROCEDURE — 84145 PROCALCITONIN (PCT): CPT | Performed by: PHYSICIAN ASSISTANT

## 2025-03-10 PROCEDURE — 85025 COMPLETE CBC W/AUTO DIFF WBC: CPT

## 2025-03-10 PROCEDURE — 0202U NFCT DS 22 TRGT SARS-COV-2: CPT | Performed by: PHYSICIAN ASSISTANT

## 2025-03-10 PROCEDURE — 87040 BLOOD CULTURE FOR BACTERIA: CPT

## 2025-03-10 PROCEDURE — 25010000002 CEFTRIAXONE PER 250 MG: Performed by: PHYSICIAN ASSISTANT

## 2025-03-10 PROCEDURE — 74177 CT ABD & PELVIS W/CONTRAST: CPT

## 2025-03-10 PROCEDURE — 87449 NOS EACH ORGANISM AG IA: CPT | Performed by: STUDENT IN AN ORGANIZED HEALTH CARE EDUCATION/TRAINING PROGRAM

## 2025-03-10 PROCEDURE — 96365 THER/PROPH/DIAG IV INF INIT: CPT

## 2025-03-10 PROCEDURE — 36415 COLL VENOUS BLD VENIPUNCTURE: CPT

## 2025-03-10 PROCEDURE — 25810000003 LACTATED RINGERS SOLUTION: Performed by: PHYSICIAN ASSISTANT

## 2025-03-10 PROCEDURE — 25010000002 ONDANSETRON PER 1 MG: Performed by: PHYSICIAN ASSISTANT

## 2025-03-10 RX ORDER — BISACODYL 10 MG
10 SUPPOSITORY, RECTAL RECTAL DAILY PRN
Status: DISCONTINUED | OUTPATIENT
Start: 2025-03-10 | End: 2025-03-12

## 2025-03-10 RX ORDER — ONDANSETRON 4 MG/1
4 TABLET, ORALLY DISINTEGRATING ORAL EVERY 6 HOURS PRN
Status: DISCONTINUED | OUTPATIENT
Start: 2025-03-10 | End: 2025-03-13 | Stop reason: HOSPADM

## 2025-03-10 RX ORDER — ONDANSETRON 2 MG/ML
4 INJECTION INTRAMUSCULAR; INTRAVENOUS ONCE
Status: COMPLETED | OUTPATIENT
Start: 2025-03-10 | End: 2025-03-10

## 2025-03-10 RX ORDER — ACETAMINOPHEN 325 MG/1
650 TABLET ORAL EVERY 4 HOURS PRN
Status: DISCONTINUED | OUTPATIENT
Start: 2025-03-10 | End: 2025-03-13 | Stop reason: HOSPADM

## 2025-03-10 RX ORDER — ACETAMINOPHEN 500 MG
1000 TABLET ORAL ONCE
Status: COMPLETED | OUTPATIENT
Start: 2025-03-10 | End: 2025-03-10

## 2025-03-10 RX ORDER — SODIUM CHLORIDE 0.9 % (FLUSH) 0.9 %
10 SYRINGE (ML) INJECTION AS NEEDED
Status: DISCONTINUED | OUTPATIENT
Start: 2025-03-10 | End: 2025-03-13 | Stop reason: HOSPADM

## 2025-03-10 RX ORDER — POLYETHYLENE GLYCOL 3350 17 G/17G
17 POWDER, FOR SOLUTION ORAL DAILY PRN
Status: DISCONTINUED | OUTPATIENT
Start: 2025-03-10 | End: 2025-03-12

## 2025-03-10 RX ORDER — POTASSIUM CHLORIDE 1500 MG/1
40 TABLET, EXTENDED RELEASE ORAL EVERY 4 HOURS
Status: COMPLETED | OUTPATIENT
Start: 2025-03-10 | End: 2025-03-10

## 2025-03-10 RX ORDER — IPRATROPIUM BROMIDE AND ALBUTEROL SULFATE 2.5; .5 MG/3ML; MG/3ML
3 SOLUTION RESPIRATORY (INHALATION)
Status: DISCONTINUED | OUTPATIENT
Start: 2025-03-10 | End: 2025-03-13 | Stop reason: HOSPADM

## 2025-03-10 RX ORDER — BISACODYL 5 MG/1
5 TABLET, DELAYED RELEASE ORAL DAILY PRN
Status: DISCONTINUED | OUTPATIENT
Start: 2025-03-10 | End: 2025-03-12

## 2025-03-10 RX ORDER — AMOXICILLIN 250 MG
2 CAPSULE ORAL 2 TIMES DAILY PRN
Status: DISCONTINUED | OUTPATIENT
Start: 2025-03-10 | End: 2025-03-12

## 2025-03-10 RX ORDER — NITROGLYCERIN 0.4 MG/1
0.4 TABLET SUBLINGUAL
Status: DISCONTINUED | OUTPATIENT
Start: 2025-03-10 | End: 2025-03-13 | Stop reason: HOSPADM

## 2025-03-10 RX ORDER — SODIUM CHLORIDE, SODIUM LACTATE, POTASSIUM CHLORIDE, CALCIUM CHLORIDE 600; 310; 30; 20 MG/100ML; MG/100ML; MG/100ML; MG/100ML
100 INJECTION, SOLUTION INTRAVENOUS CONTINUOUS
Status: DISCONTINUED | OUTPATIENT
Start: 2025-03-10 | End: 2025-03-13 | Stop reason: HOSPADM

## 2025-03-10 RX ORDER — ALBUTEROL SULFATE 90 UG/1
2 INHALANT RESPIRATORY (INHALATION) EVERY 4 HOURS PRN
Status: DISCONTINUED | OUTPATIENT
Start: 2025-03-10 | End: 2025-03-13 | Stop reason: HOSPADM

## 2025-03-10 RX ORDER — GUAIFENESIN 600 MG/1
600 TABLET, EXTENDED RELEASE ORAL EVERY 12 HOURS SCHEDULED
Status: DISCONTINUED | OUTPATIENT
Start: 2025-03-10 | End: 2025-03-13 | Stop reason: HOSPADM

## 2025-03-10 RX ORDER — ONDANSETRON 2 MG/ML
4 INJECTION INTRAMUSCULAR; INTRAVENOUS EVERY 6 HOURS PRN
Status: DISCONTINUED | OUTPATIENT
Start: 2025-03-10 | End: 2025-03-13 | Stop reason: HOSPADM

## 2025-03-10 RX ORDER — IOPAMIDOL 612 MG/ML
100 INJECTION, SOLUTION INTRAVASCULAR
Status: COMPLETED | OUTPATIENT
Start: 2025-03-10 | End: 2025-03-10

## 2025-03-10 RX ORDER — CETIRIZINE HYDROCHLORIDE 10 MG/1
10 TABLET ORAL DAILY
Status: DISCONTINUED | OUTPATIENT
Start: 2025-03-10 | End: 2025-03-13 | Stop reason: HOSPADM

## 2025-03-10 RX ORDER — BENZONATATE 100 MG/1
100 CAPSULE ORAL 3 TIMES DAILY PRN
Status: DISCONTINUED | OUTPATIENT
Start: 2025-03-10 | End: 2025-03-13 | Stop reason: HOSPADM

## 2025-03-10 RX ADMIN — POTASSIUM CHLORIDE 40 MEQ: 20 TABLET, EXTENDED RELEASE ORAL at 16:46

## 2025-03-10 RX ADMIN — SODIUM CHLORIDE, SODIUM LACTATE, POTASSIUM CHLORIDE, CALCIUM CHLORIDE 100 ML/HR: 20; 30; 600; 310 INJECTION, SOLUTION INTRAVENOUS at 14:06

## 2025-03-10 RX ADMIN — SODIUM CHLORIDE, SODIUM LACTATE, POTASSIUM CHLORIDE, CALCIUM CHLORIDE 1000 ML: 20; 30; 600; 310 INJECTION, SOLUTION INTRAVENOUS at 09:41

## 2025-03-10 RX ADMIN — POTASSIUM CHLORIDE 40 MEQ: 20 TABLET, EXTENDED RELEASE ORAL at 21:01

## 2025-03-10 RX ADMIN — CEFTRIAXONE SODIUM 1000 MG: 1 INJECTION, POWDER, FOR SOLUTION INTRAMUSCULAR; INTRAVENOUS at 11:56

## 2025-03-10 RX ADMIN — DOXYCYCLINE 100 MG: 100 INJECTION, POWDER, LYOPHILIZED, FOR SOLUTION INTRAVENOUS at 16:52

## 2025-03-10 RX ADMIN — ONDANSETRON 4 MG: 2 INJECTION, SOLUTION INTRAMUSCULAR; INTRAVENOUS at 09:43

## 2025-03-10 RX ADMIN — GUAIFENESIN 600 MG: 600 TABLET, MULTILAYER, EXTENDED RELEASE ORAL at 16:52

## 2025-03-10 RX ADMIN — BENZONATATE 100 MG: 100 CAPSULE ORAL at 23:21

## 2025-03-10 RX ADMIN — ACETAMINOPHEN 1000 MG: 500 TABLET, FILM COATED ORAL at 10:39

## 2025-03-10 RX ADMIN — IOPAMIDOL 85 ML: 612 INJECTION, SOLUTION INTRAVENOUS at 09:59

## 2025-03-10 NOTE — ED NOTES
Nursing report ED to floor  [facility]  29 y.o.  male    HPI :  HPI  Stated Reason for Visit: diarrhea, h/a, sore throat x1.5 week; dx with flu last week  History Obtained From: patient    Chief Complaint  Chief Complaint   Patient presents with    Influenza    Diarrhea    Headache       Admitting doctor:   Jonh Javier DO    Admitting diagnosis:   The primary encounter diagnosis was Multifocal pneumonia. A diagnosis of Nausea vomiting and diarrhea was also pertinent to this visit.    Code status:   Current Code Status       Date Active Code Status Order ID Comments User Context       3/10/2025 1214 CPR (Attempt to Resuscitate) [id number]  Jonh Javier DO ED        Question Answer    Code Status (Patient has no pulse and is not breathing) CPR (Attempt to Resuscitate)    Medical Interventions (Patient has pulse or is breathing) Full    Level Of Support Discussed With Patient                    Allergies:   Amoxicillin-pot clavulanate, Augmentin [amoxicillin-pot clavulanate], and Penicillins    Isolation:   No active isolations    Intake and Output  No intake or output data in the 24 hours ending 03/10/25 1349    Weight:   There were no vitals filed for this visit.    Most recent vitals:   Vitals:    03/10/25 0849 03/10/25 0851 03/10/25 0934 03/10/25 1157   BP:  117/73 111/72    Pulse: (!) 123  110    Resp: 18      Temp: (!) 101.2 °F (38.4 °C)   99.1 °F (37.3 °C)   TempSrc: Tympanic   Oral   SpO2: 93%  96%        Active LDAs/IV Access:   Lines, Drains & Airways       Active LDAs       Name Placement date Placement time Site Days    Peripheral IV 03/10/25 0938 Anterior;Left Forearm 03/10/25  0938  Forearm  less than 1                    Labs (abnormal labs have a star):   Labs Reviewed   COMPREHENSIVE METABOLIC PANEL - Abnormal; Notable for the following components:       Result Value    Glucose 102 (*)     Sodium 132 (*)     Potassium 3.2 (*)     Chloride 93 (*)     CO2 21.3 (*)     Anion Gap 17.7 (*)     All other  components within normal limits    Narrative:     GFR Categories in Chronic Kidney Disease (CKD)      GFR Category          GFR (mL/min/1.73)    Interpretation  G1                     90 or greater         Normal or high (1)  G2                      60-89                Mild decrease (1)  G3a                   45-59                Mild to moderate decrease  G3b                   30-44                Moderate to severe decrease  G4                    15-29                Severe decrease  G5                    14 or less           Kidney failure          (1)In the absence of evidence of kidney disease, neither GFR category G1 or G2 fulfill the criteria for CKD.    eGFR calculation 2021 CKD-EPI creatinine equation, which does not include race as a factor   LIPASE - Abnormal; Notable for the following components:    Lipase 10 (*)     All other components within normal limits   URINALYSIS W/ MICROSCOPIC IF INDICATED (NO CULTURE) - Abnormal; Notable for the following components:    Color, UA Dark Yellow (*)     Ketones, UA >=160 mg/dL (4+) (*)     Protein, UA 30 mg/dL (1+) (*)     All other components within normal limits   CBC WITH AUTO DIFFERENTIAL - Abnormal; Notable for the following components:    WBC 19.41 (*)     RDW 12.1 (*)     Platelets 471 (*)     Neutrophil % 78.0 (*)     Lymphocyte % 7.0 (*)     Monocyte % 13.7 (*)     Eosinophil % 0.1 (*)     Immature Grans % 1.0 (*)     Neutrophils, Absolute 15.16 (*)     Monocytes, Absolute 2.65 (*)     Immature Grans, Absolute 0.20 (*)     All other components within normal limits   URINALYSIS, MICROSCOPIC ONLY - Abnormal; Notable for the following components:    WBC, UA 3-5 (*)     All other components within normal limits   RESPIRATORY PANEL PCR W/ COVID-19 (SARS-COV-2), NP SWAB IN UTM/VTP, 2 HR TAT - Normal    Narrative:     In the setting of a positive respiratory panel with a viral infection PLUS a negative procalcitonin without other underlying concern for bacterial  "infection, consider observing off antibiotics or discontinuation of antibiotics and continue supportive care. If the respiratory panel is positive for atypical bacterial infection (Bordetella pertussis, Chlamydophila pneumoniae, or Mycoplasma pneumoniae), consider antibiotic de-escalation to target atypical bacterial infection.   LACTIC ACID, PLASMA - Normal   PROCALCITONIN - Normal    Narrative:     As a Marker for Sepsis (Non-Neonates):    1. <0.5 ng/mL represents a low risk of severe sepsis and/or septic shock.  2. >2 ng/mL represents a high risk of severe sepsis and/or septic shock.    As a Marker for Lower Respiratory Tract Infections that require antibiotic therapy:    PCT on Admission    Antibiotic Therapy       6-12 Hrs later    >0.5                Strongly Recommended  >0.25 - <0.5        Recommended   0.1 - 0.25          Discouraged              Remeasure/reassess PCT  <0.1                Strongly Discouraged     Remeasure/reassess PCT    As 28 day mortality risk marker: \"Change in Procalcitonin Result\" (>80% or <=80%) if Day 0 (or Day 1) and Day 4 values are available. Refer to http://www.[employer]-pct-calculator.com    Change in PCT <=80%  A decrease of PCT levels below or equal to 80% defines a positive change in PCT test result representing a higher risk for 28-day all-cause mortality of patients diagnosed with severe sepsis for septic shock.    Change in PCT >80%  A decrease of PCT levels of more than 80% defines a negative change in PCT result representing a lower risk for 28-day all-cause mortality of patients diagnosed with severe sepsis or septic shock.      BLOOD CULTURE   BLOOD CULTURE   GASTROINTESTINAL PANEL, PCR (PREFERRED) DOES NOT INCLUDE CDIFF   STREP PNEUMO AG, URINE OR CSF   LEGIONELLA ANTIGEN, URINE   RAINBOW DRAW    Narrative:     The following orders were created for panel order [place] Draw.  Procedure                               Abnormality         Status                   "   ---------                               -----------         ------                     Green Top (Gel)[[id number]]                                  Final result               Lavender Top[[id number]]                                     Final result               Gold Top - SST[[id number]]                                   Final result               Light Blue Top[[id number]]                                   Final result                 Please view results for these tests on the individual orders.   CBC AND DIFFERENTIAL    Narrative:     The following orders were created for panel order CBC & Differential.  Procedure                               Abnormality         Status                     ---------                               -----------         ------                     CBC Auto Differential[[id number]]        Abnormal            Final result                 Please view results for these tests on the individual orders.   GREEN TOP   LAVENDER TOP   GOLD TOP - SST   LIGHT BLUE TOP       EKG:   No orders to display       Meds given in ED:   Medications   sodium chloride 0.9 % flush 10 mL (has no administration in time range)   sodium chloride 0.9 % flush 10 mL (has no administration in time range)   lactated ringers infusion (has no administration in time range)   nitroglycerin (NITROSTAT) SL tablet 0.4 mg (has no administration in time range)   acetaminophen (TYLENOL) tablet 650 mg (has no administration in time range)   sennosides-docusate (PERICOLACE) 8.6-50 MG per tablet 2 tablet (has no administration in time range)     And   polyethylene glycol (MIRALAX) packet 17 g (has no administration in time range)     And   bisacodyl (DULCOLAX) EC tablet 5 mg (has no administration in time range)     And   bisacodyl (DULCOLAX) suppository 10 mg (has no administration in time range)   ondansetron ODT (ZOFRAN-ODT) disintegrating tablet 4 mg (has no administration in time range)     Or   ondansetron (ZOFRAN) injection 4  mg (has no administration in time range)   Potassium Replacement - Follow Nurse / BPA Driven Protocol (has no administration in time range)   Magnesium Standard Dose Replacement - Follow Nurse / BPA Driven Protocol (has no administration in time range)   Phosphorus Replacement - Follow Nurse / BPA Driven Protocol (has no administration in time range)   Calcium Replacement - Follow Nurse / BPA Driven Protocol (has no administration in time range)   lactated ringers bolus 1,000 mL (0 mL Intravenous Stopped 3/10/25 1153)   ondansetron (ZOFRAN) injection 4 mg (4 mg Intravenous Given 3/10/25 0943)   iopamidol (ISOVUE-300) 61 % injection 100 mL (85 mL Intravenous Given by Other 3/10/25 0959)   acetaminophen (TYLENOL) tablet 1,000 mg (1,000 mg Oral Given 3/10/25 1039)   cefTRIAXone (ROCEPHIN) 1,000 mg in sodium chloride 0.9 % 100 mL MBP (0 mg Intravenous Stopped 3/10/25 1240)       Imaging results:  XR Chest 2 View  Result Date: 3/10/2025  Ill-defined multifocal bilateral lung opacities. Suspect multifocal pneumonia. Can be followed to resolution.  This report was finalized on 3/10/2025 10:40 AM by Dr. Benjamin Villeda M.D on Workstation: [id number]      CT Abdomen Pelvis With Contrast  Result Date: 3/10/2025   1. Partially imaged multifocal lung opacities in each lung base, consistent with diffuse pneumonia. 2. Small volume fluid in the lumen of the nondilated cecum and ascending colon likely reflects the clinical symptom of diarrhea.  This report was finalized on 3/10/2025 10:34 AM by Juan C Arnett MD on Workstation: [id number]        Ambulatory status:   - ad lexus    Social issues:   Social History     Socioeconomic History    Marital status: Single   Tobacco Use    Smoking status: Every Day     Current packs/day: 0.50     Average packs/day: 0.5 packs/day for 11.2 years (5.6 ttl pk-yrs)     Types: Cigars, Cigarettes     Start date: 1/1/2014    Smokeless tobacco: Former     Types: Chew   Vaping Use    Vaping status:  Never Used   Substance and Sexual Activity    Alcohol use: Not Currently    Drug use: Yes     Types: Marijuana    Sexual activity: Yes     Partners: Female       Peripheral Neurovascular       Neuro Cognitive       Learning       Respiratory       Abdominal Pain       Pain Assessments  Pain (Adult)  (0-10) Pain Rating: Rest: 7  Pain Location: head  Response to Pain Interventions: nonverbal indicators present    NIH Stroke Scale       Francine Cordero RN  03/10/25 13:49 EDT

## 2025-03-10 NOTE — H&P
Patient Name:  Juancarlos Willingham  YOB: 1995  MRN:  [id number]  Admit Date:  3/10/2025  Patient Care Team:  Lindsay Marroquin APRN as PCP - General (Nurse Practitioner)      Subjective   History Present Illness     Chief Complaint   Patient presents with    Influenza    Diarrhea    Headache       Mr. Willingham is a 29 y.o. male with a history of asthma, allergic rhinitis and recent treatment for influenza that presents to [facility] complaining of cough, congestion, worsening dyspnea, nausea, vomiting and diarrhea over the past week prior to arrival.  He states that he had similar symptoms around 2 weeks ago, was diagnosed with influenza and was treated with Tamiflu, which subsequently led to improvement in symptoms.  However, roughly 1 week after that, symptoms came back and have been constant and progressive in nature since onset.  Aside from recent influenza infection, he denies recollection of other possible inciting factors.  He endorses associated decreased oral intake due to GI symptoms.  Also, endorses subjective fever and chills at home, intermittent headache, myalgias and fatigue.  Denies additional acute complaints unless otherwise noted.  Upon arrival, patient found to be febrile, tachycardic and with leukocytosis.  Imaging obtained and showed evidence suggestive of multifocal pneumonia.  He was given fluid bolus and empiric antibiotics in the emergency room, he is being admitted for further evaluation and management.    Personal History     Past Medical History:   Diagnosis Date    Allergic     Asthma      No past surgical history on file.  Family History   Problem Relation Age of Onset    Multiple sclerosis Mother     No Known Problems Father     Lung cancer Maternal Grandfather     Dementia Paternal Grandmother      Social History     Tobacco Use    Smoking status: Every Day     Current packs/day: 0.50     Average packs/day: 0.5 packs/day for 11.2 years (5.6 ttl  pk-yrs)     Types: Cigars, Cigarettes     Start date: 1/1/2014    Smokeless tobacco: Former     Types: Chew   Vaping Use    Vaping status: Never Used   Substance Use Topics    Alcohol use: Not Currently    Drug use: Yes     Types: Marijuana     No current facility-administered medications on file prior to encounter.     Current Outpatient Medications on File Prior to Encounter   Medication Sig Dispense Refill    albuterol sulfate  (90 Base) MCG/ACT inhaler Inhale 2 puffs Every 4 (Four) Hours As Needed for Wheezing. 8 g 0    cetirizine (zyrTEC) 10 MG tablet Take 1 tablet by mouth Daily.      [DISCONTINUED] albuterol (PROVENTIL) (2.5 MG/3ML) 0.083% nebulizer solution Take 2.5 mg by nebulization Every 4 (Four) Hours As Needed for Wheezing. 3 mL 0    [DISCONTINUED] albuterol sulfate  (90 Base) MCG/ACT inhaler Inhale 2 puffs Every 4 (Four) Hours As Needed for Wheezing. 8.5 g 0    [DISCONTINUED] benzonatate (TESSALON) 200 MG capsule Take 1 capsule by mouth 3 (Three) Times a Day As Needed for Cough. 21 capsule 0    [DISCONTINUED] brompheniramine-pseudoephedrine-DM 30-2-10 MG/5ML syrup Take 10 mL by mouth At Night As Needed for Congestion or Cough. 118 mL 0    [DISCONTINUED] brompheniramine-pseudoephedrine-DM 30-2-10 MG/5ML syrup Take 10 mL by mouth At Night As Needed for Congestion or Cough. 118 mL 0    [DISCONTINUED] fluticasone (Flovent HFA) 110 MCG/ACT inhaler Inhale 1 puff 2 (Two) Times a Day. 12 g 11    [DISCONTINUED] Fluticasone-Umeclidin-Vilant (Trelegy Ellipta) 100-62.5-25 MCG/ACT inhaler Inhale 1 puff Daily.      [DISCONTINUED] HYDROcodone-acetaminophen (NORCO) 5-325 MG per tablet Take 1 tablet by mouth Every 6 (Six) Hours As Needed for Moderate Pain. 10 tablet 0     Allergies   Allergen Reactions    Amoxicillin-Pot Clavulanate Swelling    Augmentin [Amoxicillin-Pot Clavulanate] Hives    Penicillins Swelling       Objective    Objective     Vital Signs  Temp:  [99.1 °F (37.3 °C)-101.2 °F (38.4 °C)]  99.1 °F (37.3 °C)  Heart Rate:  [110-123] 110  Resp:  [18] 18  BP: (111-117)/(72-73) 111/72  SpO2:  [93 %-96 %] 96 %  on   ;   Device (Oxygen Therapy): room air  There is no height or weight on file to calculate BMI.    Physical Exam  Vitals and nursing note reviewed.   Constitutional:       General: He is awake. He is not in acute distress.  Cardiovascular:      Rate and Rhythm: Tachycardia present.      Pulses: Normal pulses.      Heart sounds: Normal heart sounds.   Pulmonary:      Effort: Pulmonary effort is normal. No respiratory distress.      Breath sounds: Decreased breath sounds present. No wheezing.   Abdominal:      Palpations: Abdomen is soft.      Tenderness: There is no abdominal tenderness.   Skin:     General: Skin is warm and dry.   Neurological:      Mental Status: He is alert.   Psychiatric:         Behavior: Behavior is cooperative.         Results Review:  I reviewed the patient's new clinical results.  I reviewed the patient's new imaging results and agree with the interpretation.  I reviewed the patient's other test results and agree with the interpretation  I personally viewed and interpreted the patient's EKG/Telemetry data  Discussed with ED provider.    Lab Results (last 24 hours)       Procedure Component Value Units Date/Time    CBC & Differential [[id number]]  (Abnormal) Collected: 03/10/25 0900    Specimen: Blood from Arm, Right Updated: 03/10/25 0919    Narrative:      The following orders were created for panel order CBC & Differential.  Procedure                               Abnormality         Status                     ---------                               -----------         ------                     CBC Auto Differential[[id number]]        Abnormal            Final result                 Please view results for these tests on the individual orders.    Comprehensive Metabolic Panel [[id number]]  (Abnormal) Collected: 03/10/25 0900    Specimen: Blood Updated: 03/10/25 0954      Glucose 102 mg/dL      BUN 6 mg/dL      Creatinine 0.81 mg/dL      Sodium 132 mmol/L      Potassium 3.2 mmol/L      Chloride 93 mmol/L      CO2 21.3 mmol/L      Calcium 9.4 mg/dL      Total Protein 8.3 g/dL      Albumin 4.2 g/dL      ALT (SGPT) 27 U/L      AST (SGOT) 20 U/L      Alkaline Phosphatase 91 U/L      Total Bilirubin 1.0 mg/dL      Globulin 4.1 gm/dL      A/G Ratio 1.0 g/dL      BUN/Creatinine Ratio 7.4     Anion Gap 17.7 mmol/L      eGFR 123.9 mL/min/1.73     Narrative:      GFR Categories in Chronic Kidney Disease (CKD)      GFR Category          GFR (mL/min/1.73)    Interpretation  G1                     90 or greater         Normal or high (1)  G2                      60-89                Mild decrease (1)  G3a                   45-59                Mild to moderate decrease  G3b                   30-44                Moderate to severe decrease  G4                    15-29                Severe decrease  G5                    14 or less           Kidney failure          (1)In the absence of evidence of kidney disease, neither GFR category G1 or G2 fulfill the criteria for CKD.    eGFR calculation 2021 CKD-EPI creatinine equation, which does not include race as a factor    Lipase [[id number]]  (Abnormal) Collected: 03/10/25 0900    Specimen: Blood Updated: 03/10/25 0954     Lipase 10 U/L     CBC Auto Differential [[id number]]  (Abnormal) Collected: 03/10/25 0900    Specimen: Blood from Arm, Right Updated: 03/10/25 0919     WBC 19.41 10*3/mm3      RBC 5.09 10*6/mm3      Hemoglobin 15.2 g/dL      Hematocrit 45.2 %      MCV 88.8 fL      MCH 29.9 pg      MCHC 33.6 g/dL      RDW 12.1 %      RDW-SD 39.9 fl      MPV 11.0 fL      Platelets 471 10*3/mm3      Neutrophil % 78.0 %      Lymphocyte % 7.0 %      Monocyte % 13.7 %      Eosinophil % 0.1 %      Basophil % 0.2 %      Immature Grans % 1.0 %      Neutrophils, Absolute 15.16 10*3/mm3      Lymphocytes, Absolute 1.36 10*3/mm3      Monocytes, Absolute 2.65  "10*3/mm3      Eosinophils, Absolute 0.01 10*3/mm3      Basophils, Absolute 0.03 10*3/mm3      Immature Grans, Absolute 0.20 10*3/mm3      nRBC 0.0 /100 WBC     Lactic Acid, Plasma [[id number]]  (Normal) Collected: 03/10/25 0900    Specimen: Blood from Arm, Right Updated: 03/10/25 0945     Lactate 1.7 mmol/L     Blood Culture - Blood, Arm, Right [[id number]] Collected: 03/10/25 0900    Specimen: Blood from Arm, Right Updated: 03/10/25 0910    Procalcitonin [[id number]]  (Normal) Collected: 03/10/25 0900    Specimen: Blood Updated: 03/10/25 1149     Procalcitonin 0.10 ng/mL     Narrative:      As a Marker for Sepsis (Non-Neonates):    1. <0.5 ng/mL represents a low risk of severe sepsis and/or septic shock.  2. >2 ng/mL represents a high risk of severe sepsis and/or septic shock.    As a Marker for Lower Respiratory Tract Infections that require antibiotic therapy:    PCT on Admission    Antibiotic Therapy       6-12 Hrs later    >0.5                Strongly Recommended  >0.25 - <0.5        Recommended   0.1 - 0.25          Discouraged              Remeasure/reassess PCT  <0.1                Strongly Discouraged     Remeasure/reassess PCT    As 28 day mortality risk marker: \"Change in Procalcitonin Result\" (>80% or <=80%) if Day 0 (or Day 1) and Day 4 values are available. Refer to http://www.[facility]-pct-calculator.com    Change in PCT <=80%  A decrease of PCT levels below or equal to 80% defines a positive change in PCT test result representing a higher risk for 28-day all-cause mortality of patients diagnosed with severe sepsis for septic shock.    Change in PCT >80%  A decrease of PCT levels of more than 80% defines a negative change in PCT result representing a lower risk for 28-day all-cause mortality of patients diagnosed with severe sepsis or septic shock.       Blood Culture - Blood, Arm, Left [[id number]] Collected: 03/10/25 0904    Specimen: Blood from Arm, Left Updated: 03/10/25 0911    Urinalysis With " Microscopic If Indicated (No Culture) - Urine, Clean Catch [[id number]]  (Abnormal) Collected: 03/10/25 0905    Specimen: Urine, Clean Catch Updated: 03/10/25 0917     Color, UA Dark Yellow     Appearance, UA Clear     pH, UA 5.5     Specific Gravity, UA 1.024     Glucose, UA Negative     Ketones, UA >=160 mg/dL (4+)     Bilirubin, UA Negative     Blood, UA Negative     Protein, UA 30 mg/dL (1+)     Leuk Esterase, UA Negative     Nitrite, UA Negative     Urobilinogen, UA 1.0 E.U./dL    Urinalysis, Microscopic Only - Urine, Clean Catch [[id number]]  (Abnormal) Collected: 03/10/25 0905    Specimen: Urine, Clean Catch Updated: 03/10/25 0917     RBC, UA 0-2 /HPF      WBC, UA 3-5 /HPF      Bacteria, UA None Seen /HPF      Squamous Epithelial Cells, UA 0-2 /HPF      Hyaline Casts, UA 0-2 /LPF      Methodology Automated Microscopy    Respiratory Panel PCR w/COVID-19(SARS-CoV-2) LAY/JESSA/JORDAN/PAD/COR/SUE In-House, NP Swab in UTM/VTM, 2 HR TAT - Swab, Nasopharynx [[id number]] Collected: 03/10/25 1040    Specimen: Swab from Nasopharynx Updated: 03/10/25 1044            Imaging Results (Last 24 Hours)       Procedure Component Value Units Date/Time    XR Chest 2 View [[id number]] Collected: 03/10/25 1039     Updated: 03/10/25 1043    Narrative:      XR CHEST 2 VW-        INDICATION: Cough, influenza     COMPARISON: Chest radiograph Malissa 15, 2022     TECHNIQUE: 2 view chest     FINDINGS:      Ill-defined multifocal bilateral lung opacities. No effusions. Stable  mediastinum. Heart is normal in size.       Impression:      Ill-defined multifocal bilateral lung opacities. Suspect multifocal  pneumonia. Can be followed to resolution.     This report was finalized on 3/10/2025 10:40 AM by Dr. Benjamin Villeda M.D on Workstation: [id number]       CT Abdomen Pelvis With Contrast [[id number]] Collected: 03/10/25 1027     Updated: 03/10/25 1037    Narrative:      CT ABDOMEN PELVIS W CONTRAST-     DATE OF EXAM: 3/10/2025 9:46 AM      INDICATION: vomiting, diarrhea,  x4 days leukocytosis.     COMPARISON: Chest radiographs 3/10/2025 and 6/15/2022. CT abdomen pelvis  11/20/2019.     TECHNIQUE: Multiple contiguous axial images were acquired through the  abdomen and pelvis following the intravenous administration of 85 mL of  Isovue-300. Reformatted coronal and sagittal sequences were also  reviewed. Radiation dose reduction techniques were utilized, including  automated exposure control and exposure modulation based on body size.     FINDINGS:  Respiratory motion artifact mild limits evaluation of the lung bases.  Multifocal patchy and nodular consolidation in each lung base with  scattered groundglass opacities and interstitial thickening, likely  pneumonia.     The liver, gallbladder, spleen, pancreas, adrenal glands, and kidneys  are unremarkable. The urinary bladder is nondistended. Urinary bladder  wall thickening is likely accentuated by underdistention.     Minimal colorectal stool. Small volume fluid in the lumen of the  nondilated cecum and ascending colon likely reflects the clinical  symptom of diarrhea. No bowel obstruction or significant bowel wall  thickening. The appendix is normal.     No free fluid in the abdomen or pelvis. No free intraperitoneal air. No  pathologically enlarged lymph nodes in the abdomen or pelvis.  Transitional lumbosacral vertebral anatomy. No acute osseous abnormality  or concerning osseous lesion.       Impression:         1. Partially imaged multifocal lung opacities in each lung base,  consistent with diffuse pneumonia.  2. Small volume fluid in the lumen of the nondilated cecum and ascending  colon likely reflects the clinical symptom of diarrhea.     This report was finalized on 3/10/2025 10:34 AM by Juan C Arnett MD on  Workstation: [id number]                   No orders to display        Assessment/Plan     Active Hospital Problems    Diagnosis  POA    **Multifocal pneumonia [J18.9]  Yes    Sepsis  [A41.9]  Yes    Diarrhea [R19.7]  Yes    Nausea with vomiting [R11.2]  Yes    Hyponatremia [E87.1]  Yes    Hypokalemia [E87.6]  Yes    Allergic rhinitis [J30.9]  Yes    Asthma [J45.909]  Yes      Resolved Hospital Problems   No resolved problems to display.     Mr. Willingham is a 29 y.o. male with a history of asthma, allergic rhinitis and recent treatment for influenza that presents to [facility] complaining of cough, congestion, worsening dyspnea, nausea, vomiting and diarrhea over the past week prior to arrival.     SIRS 3/4 on arrival with fever, tachycardia and leukocytosis. Imaging showing findings of multifocal pneumonia. Patient recently diagnosed and treated for influenza, concern for post-influenza bacterial pneumonia. Blood cultures ordered, s/p fluid bolus in ED and empiric antibiotics. RVP negative. Start Ceftriaxone, Doxycycline for CAP treatment. Order fluids, nebulizers, supportive medications for symptoms. Check strep/legionella urine antigens. Trend WBC with CBC.  Nausea, vomiting and diarrhea noted on arrival. He has associated decreased intake. Will place on clear liquid diet for now, check GI PCR. Order PRN medications for symptoms.  Sodium low on arrival, likely hypovolemic in nature, monitor with fluids.   Potassium low, will replete per protocol and follow up repeat testing.  Dehydration likely a result of GI losses, decreased intake. Monitor with fluids.  No evidence of asthma exacerbation, as he has no appreciable wheezing or respiratory distress or hypoxia. Will treat with nebulizers and closely monitor respiratory status.  Further management based on clinical course.  I discussed the patient's findings and my recommendations with patient, nursing staff, and ED provider. Home medication list reviewed and verified, medications reordered as appropriate.    VTE Prophylaxis - SCDs.  Code Status - Full code.       Jonh Javier DO  [facility]  03/10/25  12:16  EDT

## 2025-03-10 NOTE — ED PROVIDER NOTES
EMERGENCY DEPARTMENT ENCOUNTER  Room Number:  S508/1  PCP: Lindsay Marroquin APRN  Independent Historians: Patient      HPI:  Chief Complaint: had concerns including Influenza, Diarrhea, and Headache.     A complete HPI/ROS/PMH/PSH/SH/FH are unobtainable due to: None    Chronic or social conditions impacting patient care (Social Determinants of Health): None      Context: The patient is a 29 y.o. male with a medical history of asthma who presents to the ED c/o acute symptoms that started 3 days ago including nausea with too numerous to count episodes of nonbloody emesis and watery stools, cough, sore throat headache, fever body aches and chills.  He states he was diagnosed with influenza 2 weeks ago at an urgent care and did take a course of medication for it.  The symptoms had resolved before these began.  States his significant other started coughing yesterday.  Denies any abdominal pain hematuria dysuria urinary frequency.  No prior abdominal surgeries.      Review of prior external notes (non-ED) -and- Review of prior external test results outside of this encounter:  Labs performed 6/15/2022 and creatinine was 0.71, hemoglobin 14.7, blood cell count was 11.5.        PAST MEDICAL HISTORY  Active Ambulatory Problems     Diagnosis Date Noted    Acne 11/06/2013    Allergic rhinitis 03/29/2022    Asthma 09/04/2012     Resolved Ambulatory Problems     Diagnosis Date Noted    No Resolved Ambulatory Problems     Past Medical History:   Diagnosis Date    Allergic          PAST SURGICAL HISTORY  History reviewed. No pertinent surgical history.      FAMILY HISTORY  Family History   Problem Relation Age of Onset    Multiple sclerosis Mother     No Known Problems Father     Lung cancer Maternal Grandfather     Dementia Paternal Grandmother          SOCIAL HISTORY  Social History     Socioeconomic History    Marital status: Single   Tobacco Use    Smoking status: Every Day     Current packs/day: 0.50     Average  packs/day: 0.5 packs/day for 11.2 years (5.6 ttl pk-yrs)     Types: Cigars, Cigarettes     Start date: 1/1/2014    Smokeless tobacco: Former     Types: Chew   Vaping Use    Vaping status: Never Used   Substance and Sexual Activity    Alcohol use: Not Currently    Drug use: Yes     Types: Marijuana, 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      PHYSICAL EXAM    I have reviewed the triage vital signs and nursing notes.    ED Triage Vitals   Temp Heart Rate Resp BP SpO2   03/10/25 0849 03/10/25 0849 03/10/25 0849 03/10/25 0851 03/10/25 0849   (!) 101.2 °F (38.4 °C) (!) 123 18 117/73 93 %      Temp src Heart Rate Source Patient Position BP Location FiO2 (%)   03/10/25 0849 -- -- -- --   Tympanic           Physical Exam  GENERAL: alert, no acute distress  SKIN: Warm, dry  HENT: Normocephalic, atraumatic  EYES: no scleral icterus  CV: regular rhythm, tachycardic  RESPIRATORY: normal effort, lungs clear  ABDOMEN: nondistended, soft, nontender, normal bowel sounds no guarding or rigidity  MUSCULOSKELETAL: no deformity  NEURO: alert, moves all extremities, follows commands            LAB RESULTS  Recent Results (from the past 24 hours)   Comprehensive Metabolic Panel    Collection Time: 03/10/25  9:00 AM    Specimen: Blood   Result Value Ref Range    Glucose 102 (H) 65 - 99 mg/dL    BUN 6 6 - 20 mg/dL    Creatinine 0.81 0.76 - 1.27 mg/dL    Sodium 132 (L) 136 - 145 mmol/L    Potassium 3.2 (L) 3.5 - 5.2 mmol/L    Chloride 93 (L) 98 - 107 mmol/L    CO2 21.3 (L) 22.0 - 29.0 mmol/L    Calcium 9.4 8.6 - 10.5 mg/dL    Total Protein 8.3 6.0 - 8.5 g/dL    Albumin 4.2 3.5 - 5.2 g/dL    ALT (SGPT) 27 1 - 41 U/L    AST (SGOT) 20 1 - 40 U/L    Alkaline  Phosphatase 91 39 - 117 U/L    Total Bilirubin 1.0 0.0 - 1.2 mg/dL    Globulin 4.1 gm/dL    A/G Ratio 1.0 g/dL    BUN/Creatinine Ratio 7.4 7.0 - 25.0    Anion Gap 17.7 (H) 5.0 - 15.0 mmol/L    eGFR 123.9 >60.0 mL/min/1.73   Lipase    Collection Time: 03/10/25  9:00 AM    Specimen: Blood   Result Value Ref Range    Lipase 10 (L) 13 - 60 U/L   Green Top (Gel)    Collection Time: 03/10/25  9:00 AM   Result Value Ref Range    Extra Tube Hold for add-ons.    Lavender Top    Collection Time: 03/10/25  9:00 AM   Result Value Ref Range    Extra Tube hold for add-on    Gold Top - SST    Collection Time: 03/10/25  9:00 AM   Result Value Ref Range    Extra Tube Hold for add-ons.    Light Blue Top    Collection Time: 03/10/25  9:00 AM   Result Value Ref Range    Extra Tube Hold for add-ons.    CBC Auto Differential    Collection Time: 03/10/25  9:00 AM    Specimen: Arm, Right; Blood   Result Value Ref Range    WBC 19.41 (H) 3.40 - 10.80 10*3/mm3    RBC 5.09 4.14 - 5.80 10*6/mm3    Hemoglobin 15.2 13.0 - 17.7 g/dL    Hematocrit 45.2 37.5 - 51.0 %    MCV 88.8 79.0 - 97.0 fL    MCH 29.9 26.6 - 33.0 pg    MCHC 33.6 31.5 - 35.7 g/dL    RDW 12.1 (L) 12.3 - 15.4 %    RDW-SD 39.9 37.0 - 54.0 fl    MPV 11.0 6.0 - 12.0 fL    Platelets 471 (H) 140 - 450 10*3/mm3    Neutrophil % 78.0 (H) 42.7 - 76.0 %    Lymphocyte % 7.0 (L) 19.6 - 45.3 %    Monocyte % 13.7 (H) 5.0 - 12.0 %    Eosinophil % 0.1 (L) 0.3 - 6.2 %    Basophil % 0.2 0.0 - 1.5 %    Immature Grans % 1.0 (H) 0.0 - 0.5 %    Neutrophils, Absolute 15.16 (H) 1.70 - 7.00 10*3/mm3    Lymphocytes, Absolute 1.36 0.70 - 3.10 10*3/mm3    Monocytes, Absolute 2.65 (H) 0.10 - 0.90 10*3/mm3    Eosinophils, Absolute 0.01 0.00 - 0.40 10*3/mm3    Basophils, Absolute 0.03 0.00 - 0.20 10*3/mm3    Immature Grans, Absolute 0.20 (H) 0.00 - 0.05 10*3/mm3    nRBC 0.0 0.0 - 0.2 /100 WBC   Lactic Acid, Plasma    Collection Time: 03/10/25  9:00 AM    Specimen: Arm, Right; Blood   Result Value Ref Range     Lactate 1.7 0.5 - 2.0 mmol/L   Procalcitonin    Collection Time: 03/10/25  9:00 AM    Specimen: Blood   Result Value Ref Range    Procalcitonin 0.10 0.00 - 0.25 ng/mL   Urinalysis With Microscopic If Indicated (No Culture) - Urine, Clean Catch    Collection Time: 03/10/25  9:05 AM    Specimen: Urine, Clean Catch   Result Value Ref Range    Color, UA Dark Yellow (A) Yellow, Straw    Appearance, UA Clear Clear    pH, UA 5.5 5.0 - 8.0    Specific Gravity, UA 1.024 1.005 - 1.030    Glucose, UA Negative Negative    Ketones, UA >=160 mg/dL (4+) (A) Negative    Bilirubin, UA Negative Negative    Blood, UA Negative Negative    Protein, UA 30 mg/dL (1+) (A) Negative    Leuk Esterase, UA Negative Negative    Nitrite, UA Negative Negative    Urobilinogen, UA 1.0 E.U./dL 0.2 - 1.0 E.U./dL   Urinalysis, Microscopic Only - Urine, Clean Catch    Collection Time: 03/10/25  9:05 AM    Specimen: Urine, Clean Catch   Result Value Ref Range    RBC, UA 0-2 None Seen, 0-2 /HPF    WBC, UA 3-5 (A) None Seen, 0-2 /HPF    Bacteria, UA None Seen None Seen /HPF    Squamous Epithelial Cells, UA 0-2 None Seen, 0-2 /HPF    Hyaline Casts, UA 0-2 None Seen /LPF    Methodology Automated Microscopy    Respiratory Panel PCR w/COVID-19(SARS-CoV-2) LAY/JESSA/JORDAN/PAD/COR/SUE In-House, NP Swab in [facility], 2 HR TAT - Swab, Nasopharynx    Collection Time: 03/10/25 10:40 AM    Specimen: Nasopharynx; Swab   Result Value Ref Range    ADENOVIRUS, PCR Not Detected Not Detected    Coronavirus 229E Not Detected Not Detected    Coronavirus HKU1 Not Detected Not Detected    Coronavirus NL63 Not Detected Not Detected    Coronavirus OC43 Not Detected Not Detected    COVID19 Not Detected Not Detected - Ref. Range    Human Metapneumovirus Not Detected Not Detected    Human Rhinovirus/Enterovirus Not Detected Not Detected    Influenza A PCR Not Detected Not Detected    Influenza B PCR Not Detected Not Detected    Parainfluenza Virus 1 Not Detected Not Detected     Parainfluenza Virus 2 Not Detected Not Detected    Parainfluenza Virus 3 Not Detected Not Detected    Parainfluenza Virus 4 Not Detected Not Detected    RSV, PCR Not Detected Not Detected    Bordetella pertussis pcr Not Detected Not Detected    Bordetella parapertussis PCR Not Detected Not Detected    Chlamydophila pneumoniae PCR Not Detected Not Detected    Mycoplasma pneumo by PCR Not Detected Not Detected         RADIOLOGY  XR Chest 2 View  Result Date: 3/10/2025  XR CHEST 2 VW-   INDICATION: Cough, influenza  COMPARISON: Chest radiograph Malissa 15, 2022  TECHNIQUE: 2 view chest  FINDINGS:  Ill-defined multifocal bilateral lung opacities. No effusions. Stable mediastinum. Heart is normal in size.      Ill-defined multifocal bilateral lung opacities. Suspect multifocal pneumonia. Can be followed to resolution.  This report was finalized on 3/10/2025 10:40 AM by Dr. Benjamin Villeda M.D on Workstation: [id number]      CT Abdomen Pelvis With Contrast  Result Date: 3/10/2025  CT ABDOMEN PELVIS W CONTRAST-  DATE OF EXAM: 3/10/2025 9:46 AM  INDICATION: vomiting, diarrhea,  x4 days leukocytosis.  COMPARISON: Chest radiographs 3/10/2025 and 6/15/2022. CT abdomen pelvis 11/20/2019.  TECHNIQUE: Multiple contiguous axial images were acquired through the abdomen and pelvis following the intravenous administration of 85 mL of Isovue-300. Reformatted coronal and sagittal sequences were also reviewed. Radiation dose reduction techniques were utilized, including automated exposure control and exposure modulation based on body size.  FINDINGS: Respiratory motion artifact mild limits evaluation of the lung bases. Multifocal patchy and nodular consolidation in each lung base with scattered groundglass opacities and interstitial thickening, likely pneumonia.  The liver, gallbladder, spleen, pancreas, adrenal glands, and kidneys are unremarkable. The urinary bladder is nondistended. Urinary bladder wall thickening is likely  accentuated by underdistention.  Minimal colorectal stool. Small volume fluid in the lumen of the nondilated cecum and ascending colon likely reflects the clinical symptom of diarrhea. No bowel obstruction or significant bowel wall thickening. The appendix is normal.  No free fluid in the abdomen or pelvis. No free intraperitoneal air. No pathologically enlarged lymph nodes in the abdomen or pelvis. Transitional lumbosacral vertebral anatomy. No acute osseous abnormality or concerning osseous lesion.       1. Partially imaged multifocal lung opacities in each lung base, consistent with diffuse pneumonia. 2. Small volume fluid in the lumen of the nondilated cecum and ascending colon likely reflects the clinical symptom of diarrhea.  This report was finalized on 3/10/2025 10:34 AM by Juan C Arnett MD on Workstation: [id number]          MEDICATIONS GIVEN IN ER  Medications   sodium chloride 0.9 % flush 10 mL (has no administration in time range)   sodium chloride 0.9 % flush 10 mL (has no administration in time range)   lactated ringers infusion (100 mL/hr Intravenous New Bag 3/10/25 1406)   nitroglycerin (NITROSTAT) SL tablet 0.4 mg (has no administration in time range)   acetaminophen (TYLENOL) tablet 650 mg (has no administration in time range)   sennosides-docusate (PERICOLACE) 8.6-50 MG per tablet 2 tablet (has no administration in time range)     And   polyethylene glycol (MIRALAX) packet 17 g (has no administration in time range)     And   bisacodyl (DULCOLAX) EC tablet 5 mg (has no administration in time range)     And   bisacodyl (DULCOLAX) suppository 10 mg (has no administration in time range)   ondansetron ODT (ZOFRAN-ODT) disintegrating tablet 4 mg (has no administration in time range)     Or   ondansetron (ZOFRAN) injection 4 mg (has no administration in time range)   Potassium Replacement - Follow Nurse / BPA Driven Protocol (has no administration in time range)   Magnesium Standard Dose Replacement -  Follow Nurse / BPA Driven Protocol (has no administration in time range)   Phosphorus Replacement - Follow Nurse / BPA Driven Protocol (has no administration in time range)   Calcium Replacement - Follow Nurse / BPA Driven Protocol (has no administration in time range)   cefTRIAXone (ROCEPHIN) 2,000 mg in sodium chloride 0.9 % 100 mL MBP (has no administration in time range)   doxycycline (VIBRAMYCIN) 100 mg in sodium chloride 0.9 % 100 mL MBP (has no administration in time range)   cetirizine (zyrTEC) tablet 10 mg (10 mg Oral Not Given 3/10/25 1619)   albuterol sulfate HFA (PROVENTIL HFA;VENTOLIN HFA;PROAIR HFA) inhaler 2 puff (has no administration in time range)   guaiFENesin (MUCINEX) 12 hr tablet 600 mg (has no administration in time range)   benzonatate (TESSALON) capsule 100 mg (has no administration in time range)   potassium chloride (KLOR-CON M20) CR tablet 40 mEq (has no administration in time range)   lactated ringers bolus 1,000 mL (0 mL Intravenous Stopped 3/10/25 1153)   ondansetron (ZOFRAN) injection 4 mg (4 mg Intravenous Given 3/10/25 0943)   iopamidol (ISOVUE-300) 61 % injection 100 mL (85 mL Intravenous Given by Other 3/10/25 0959)   acetaminophen (TYLENOL) tablet 1,000 mg (1,000 mg Oral Given 3/10/25 1039)   cefTRIAXone (ROCEPHIN) 1,000 mg in sodium chloride 0.9 % 100 mL MBP (0 mg Intravenous Stopped 3/10/25 1240)         ORDERS PLACED DURING THIS VISIT:  Orders Placed This Encounter   Procedures    Blood Culture - Blood,    Blood Culture - Blood,    Respiratory Panel PCR w/COVID-19(SARS-CoV-2) LAY/JESSA/JORDAN/PAD/COR/SUE In-House, NP Swab in UTM/VTM, 2 HR TAT - Swab, Nasopharynx    Gastrointestinal Panel, PCR - Stool, Per Rectum    S. Pneumo Ag Urine or CSF - Urine, Urine, Clean Catch    Legionella Antigen, Urine - Urine, Urine, Clean Catch    XR Chest 2 View    CT Abdomen Pelvis With Contrast    [place] Draw    Comprehensive Metabolic Panel    Lipase    Urinalysis With Microscopic If Indicated (No  Culture) - Urine, Clean Catch    CBC Auto Differential    Lactic Acid, Plasma    Urinalysis, Microscopic Only - Urine, Clean Catch    Procalcitonin    Lactic Acid, Plasma    Procalcitonin    Potassium    Diet: Liquid; Clear Liquid; Fluid Consistency: Thin (IDDSI 0)    Undress & Gown    Undress & Gown    Vital Signs    Vital Signs    Maintain IV Access    Telemetry - Place Orders & Notify Provider of Results When Patient Experiences Acute Chest Pain, Dysrhythmia or Respiratory Distress    May Be Off Telemetry for Tests    Activity - Ad Linnette    Intake & Output    Daily Weights    Oral Care    Place Sequential Compression Device    Maintain Sequential Compression Device    Continuous Pulse Oximetry    Code Status and Medical Interventions: CPR (Attempt to Resuscitate); Full    LHA (on-call MD unless specified) Details    Inpatient Case Management  Consult    Oxygen Therapy- Nasal Cannula; Titrate 1-6 LPM Per SpO2; 90 - 95%    Oxygen Therapy- Nasal Cannula; Titrate 1-6 LPM Per SpO2; 90 - 95%    Telemetry Scan    Insert Peripheral IV    Insert Peripheral IV    Initiate Observation Status    CBC & Differential    Green Top (Gel)    Lavender Top    Gold Top - SST    Light Blue Top         OUTPATIENT MEDICATION MANAGEMENT:  Current Facility-Administered Medications Ordered in Epic   Medication Dose Route Frequency Provider Last Rate Last Admin    acetaminophen (TYLENOL) tablet 650 mg  650 mg Oral Q4H PRN Concepcion, Jonh,         albuterol sulfate HFA (PROVENTIL HFA;VENTOLIN HFA;PROAIR HFA) inhaler 2 puff  2 puff Inhalation Q4H PRN Concepcion, Jonh, DO        benzonatate (TESSALON) capsule 100 mg  100 mg Oral TID PRN Jonh Javier DO        sennosides-docusate (PERICOLACE) 8.6-50 MG per tablet 2 tablet  2 tablet Oral BID PRN Jonh Javier DO        And    polyethylene glycol (MIRALAX) packet 17 g  17 g Oral Daily PRN Jonh Javier, DO        And    bisacodyl (DULCOLAX) EC tablet 5 mg  5 mg Oral Daily PRN Concepcion,  DO Jonh        And    bisacodyl (DULCOLAX) suppository 10 mg  10 mg Rectal Daily PRN Jonh Javier DO        Calcium Replacement - Follow Nurse / BPA Driven Protocol   Not Applicable PRN Jonh Javier DO        [START ON 3/11/2025] cefTRIAXone (ROCEPHIN) 2,000 mg in sodium chloride 0.9 % 100 mL MBP  2,000 mg Intravenous Q24H Jonh Javier DO        cetirizine (zyrTEC) tablet 10 mg  10 mg Oral Daily Jonh Javier DO        doxycycline (VIBRAMYCIN) 100 mg in sodium chloride 0.9 % 100 mL MBP  100 mg Intravenous Q12H Jonh Javier DO        guaiFENesin (MUCINEX) 12 hr tablet 600 mg  600 mg Oral Q12H Jonh Javier DO        lactated ringers infusion  100 mL/hr Intravenous Continuous Jonh Javier  mL/hr at 03/10/25 1406 100 mL/hr at 03/10/25 1406    Magnesium Standard Dose Replacement - Follow Nurse / BPA Driven Protocol   Not Applicable PRN Jonh Javier DO        nitroglycerin (NITROSTAT) SL tablet 0.4 mg  0.4 mg Sublingual Q5 Min PRN Jonh Javier DO        ondansetron ODT (ZOFRAN-ODT) disintegrating tablet 4 mg  4 mg Oral Q6H PRN Jonh Javier DO        Or    ondansetron (ZOFRAN) injection 4 mg  4 mg Intravenous Q6H PRN Jonh Javier,         Phosphorus Replacement - Follow Nurse / BPA Driven Protocol   Not Applicable PRN Jonh Javier DO        potassium chloride (KLOR-CON M20) CR tablet 40 mEq  40 mEq Oral Q4H Jonh Javier DO        Potassium Replacement - Follow Nurse / BPA Driven Protocol   Not Applicable PRN Jonh Javier DO        sodium chloride 0.9 % flush 10 mL  10 mL Intravenous PRN Maile Quiles MD        sodium chloride 0.9 % flush 10 mL  10 mL Intravenous PRN Maile Quiles MD         No current [facility]-ordered outpatient medications on file.         PROCEDURES  Procedures            PROGRESS, DATA ANALYSIS, CONSULTS, AND MEDICAL DECISION MAKING  All labs have been independently interpreted by me.  All radiology studies have been reviewed by me. All EKG's have been independently  viewed and interpreted by me.  Discussion below represents my analysis of pertinent findings related to patient's condition, differential diagnosis, treatment plan and final disposition.    DIFFERENTIAL    Differential diagnosis includes but is not limited to:  - hepatobiliary pathology such as cholecystitis, cholangitis, and symptomatic cholelithiasis  - Pancreatitis  - Dyspepsia  - Small bowel obstruction  - Appendicitis  - Diverticulitis  - UTI including pyelonephritis  - Ureteral stone  - Zoster  - Colitis, including infectious and ischemic  - Atypical ACS      Clinical Scores:                  ED Course as of 03/10/25 1633   Mon Mar 10, 2025   0935 WBC(!): 19.41 [KA]   0936 Hemoglobin: 15.2 [KA]   1031 Glucose(!): 102 [KA]   1031 Creatinine: 0.81 [KA]   1031 Potassium(!): 3.2 [KA]   1031 Lipase(!): 10 [KA]   1031 Lactate: 1.7 [KA]   1031 Nitrite, UA: Negative [KA]   1031 Leukocytes, UA: Negative [KA]   1031 Blood, UA: Negative [KA]   1211 I discussed patient with Dr. Javier, hospitalist including history presentation workup and he agrees to admit. [KA]      ED Course User Index  [KA] Patricia Shaffer PA-C       Reassessed the patient counseled him on all of his lab and imaging results.  He does have bilateral multifocal pneumonia, no hypoxia.  He is having severe nausea vomiting diarrhea limiting his ability to tolerate p.o. antibiotics.  He has a leukocytosis of 19,000 with procalcitonin lactic acid are normal, respiratory panel negative.  He does have findings of poor intake including ketonuria and mild hypokalemia.  I recommended admission for IV antibiotics and fluids and antiemetics and he is agreeable.      AS OF 16:33 EDT VITALS:    BP - 133/75  HR - 86  TEMP - 98.2 °F (36.8 °C) (Oral)  O2 SATS - 96%    COMPLEXITY OF CARE  The patient requires admission.      DIAGNOSIS  Final diagnoses:   Multifocal pneumonia   Nausea vomiting and diarrhea         DISPOSITION  ED Disposition       ED Disposition    Decision to Admit    Condition   --    Comment   Level of Care: Telemetry [5]   Diagnosis: Multifocal pneumonia [[id number]]   Admitting Physician: GEOVANI GRANDA [[id number]]   Attending Physician: GEOVANI GRANDA [[id number]]   Is patient appropriate for Inpatient Observation Unit?: Yes [1]                                Please note that portions of this document were completed with a voice recognition program.    Note Disclaimer: At [facility], we believe that sharing information builds trust and better relationships. You are receiving this note because you recently visited [facility]. It is possible you will see health information before a provider has talked with you about it. This kind of information can be easy to misunderstand. To help you fully understand what it means for your health, we urge you to discuss this note with your provider.         Patricia Shaffer PA-C  03/10/25 [id number]

## 2025-03-10 NOTE — PROGRESS NOTES
Clinical Pharmacy Services: Medication History    Juancarlos Willingham is a 29 y.o. male presenting to [facility] for   Chief Complaint   Patient presents with    Influenza    Diarrhea    Headache       He  has a past medical history of Allergic and Asthma.    Allergies as of 03/10/2025 - Reviewed 03/10/2025   Allergen Reaction Noted    Amoxicillin-pot clavulanate Swelling 11/06/2013    Augmentin [amoxicillin-pot clavulanate] Hives 11/20/2019    Penicillins Swelling 07/16/2012       Medication information was obtained from: Patient   Pharmacy and Phone Number:     Prior to Admission Medications       Prescriptions Last Dose Informant Patient Reported? Taking?    albuterol sulfate  (90 Base) MCG/ACT inhaler  Self No Yes    Inhale 2 puffs Every 4 (Four) Hours As Needed for Wheezing.    cetirizine (zyrTEC) 10 MG tablet  Self Yes Yes    Take 1 tablet by mouth Daily.              Medication notes:     This medication list is complete to the best of my knowledge as of 3/10/2025    Please call if questions.    Mohan Schaeffer  Medication History Technician  [phone]    3/10/2025 12:12 EDT

## 2025-03-10 NOTE — ED PROVIDER NOTES
MD ATTESTATION NOTE    SHARED VISIT: This visit was performed by BOTH a physician and an APC. The substantive portion of the medical decision making was performed by this attesting physician who made or approved the management plan and takes responsibility for patient management. All studies in the APC note (if performed) were independently interpreted by me.     The JOSE DANIEL and I have discussed this patient's history, physical exam, and treatment plan.  I have reviewed the documentation and affirm the documentation and agree with the treatment and plan.  The attached note describes my personal findings.      Independent Historians: Patient    A complete HPI/ROS/PMH/PSH/SH/FH are unobtainable due to: None    Chronic or social conditions impacting patient care (social determinants of health): None    Juancarlos Willingham is a 29 y.o. male who presents to the ED c/o acute cough, congestion, nausea vomiting, fevers, loose watery stools with illness that initially started 2 weeks ago and has continued with worsening 3 days ago.  Patient with no abdominal pain, dysuria, rashes.  Patient positive for flu 2 weeks ago.          On exam:  GENERAL: Ill-appearing but cooperative male, alert, no acute distress  SKIN: Warm, moist  HENT: Normocephalic, atraumatic  EYES: no scleral icterus  CV: regular rhythm, accelerated rate  RESPIRATORY: normal effort, rhonchi bilaterally  ABDOMEN: soft, nontender, nondistended  MUSCULOSKELETAL: no deformity  NEURO: alert, moves all extremities, follows commands                                                             Labs  Recent Results (from the past 24 hours)   Comprehensive Metabolic Panel    Collection Time: 03/10/25  9:00 AM    Specimen: Blood   Result Value Ref Range    Glucose 102 (H) 65 - 99 mg/dL    BUN 6 6 - 20 mg/dL    Creatinine 0.81 0.76 - 1.27 mg/dL    Sodium 132 (L) 136 - 145 mmol/L    Potassium 3.2 (L) 3.5 - 5.2 mmol/L    Chloride 93 (L) 98 - 107 mmol/L    CO2 21.3 (L) 22.0 - 29.0  mmol/L    Calcium 9.4 8.6 - 10.5 mg/dL    Total Protein 8.3 6.0 - 8.5 g/dL    Albumin 4.2 3.5 - 5.2 g/dL    ALT (SGPT) 27 1 - 41 U/L    AST (SGOT) 20 1 - 40 U/L    Alkaline Phosphatase 91 39 - 117 U/L    Total Bilirubin 1.0 0.0 - 1.2 mg/dL    Globulin 4.1 gm/dL    A/G Ratio 1.0 g/dL    BUN/Creatinine Ratio 7.4 7.0 - 25.0    Anion Gap 17.7 (H) 5.0 - 15.0 mmol/L    eGFR 123.9 >60.0 mL/min/1.73   Lipase    Collection Time: 03/10/25  9:00 AM    Specimen: Blood   Result Value Ref Range    Lipase 10 (L) 13 - 60 U/L   Green Top (Gel)    Collection Time: 03/10/25  9:00 AM   Result Value Ref Range    Extra Tube Hold for add-ons.    Lavender Top    Collection Time: 03/10/25  9:00 AM   Result Value Ref Range    Extra Tube hold for add-on    Gold Top - SST    Collection Time: 03/10/25  9:00 AM   Result Value Ref Range    Extra Tube Hold for add-ons.    Light Blue Top    Collection Time: 03/10/25  9:00 AM   Result Value Ref Range    Extra Tube Hold for add-ons.    CBC Auto Differential    Collection Time: 03/10/25  9:00 AM    Specimen: Arm, Right; Blood   Result Value Ref Range    WBC 19.41 (H) 3.40 - 10.80 10*3/mm3    RBC 5.09 4.14 - 5.80 10*6/mm3    Hemoglobin 15.2 13.0 - 17.7 g/dL    Hematocrit 45.2 37.5 - 51.0 %    MCV 88.8 79.0 - 97.0 fL    MCH 29.9 26.6 - 33.0 pg    MCHC 33.6 31.5 - 35.7 g/dL    RDW 12.1 (L) 12.3 - 15.4 %    RDW-SD 39.9 37.0 - 54.0 fl    MPV 11.0 6.0 - 12.0 fL    Platelets 471 (H) 140 - 450 10*3/mm3    Neutrophil % 78.0 (H) 42.7 - 76.0 %    Lymphocyte % 7.0 (L) 19.6 - 45.3 %    Monocyte % 13.7 (H) 5.0 - 12.0 %    Eosinophil % 0.1 (L) 0.3 - 6.2 %    Basophil % 0.2 0.0 - 1.5 %    Immature Grans % 1.0 (H) 0.0 - 0.5 %    Neutrophils, Absolute 15.16 (H) 1.70 - 7.00 10*3/mm3    Lymphocytes, Absolute 1.36 0.70 - 3.10 10*3/mm3    Monocytes, Absolute 2.65 (H) 0.10 - 0.90 10*3/mm3    Eosinophils, Absolute 0.01 0.00 - 0.40 10*3/mm3    Basophils, Absolute 0.03 0.00 - 0.20 10*3/mm3    Immature Grans, Absolute 0.20 (H)  0.00 - 0.05 10*3/mm3    nRBC 0.0 0.0 - 0.2 /100 WBC   Lactic Acid, Plasma    Collection Time: 03/10/25  9:00 AM    Specimen: Arm, Right; Blood   Result Value Ref Range    Lactate 1.7 0.5 - 2.0 mmol/L   Procalcitonin    Collection Time: 03/10/25  9:00 AM    Specimen: Blood   Result Value Ref Range    Procalcitonin 0.10 0.00 - 0.25 ng/mL   Urinalysis With Microscopic If Indicated (No Culture) - Urine, Clean Catch    Collection Time: 03/10/25  9:05 AM    Specimen: Urine, Clean Catch   Result Value Ref Range    Color, UA Dark Yellow (A) Yellow, Straw    Appearance, UA Clear Clear    pH, UA 5.5 5.0 - 8.0    Specific Gravity, UA 1.024 1.005 - 1.030    Glucose, UA Negative Negative    Ketones, UA >=160 mg/dL (4+) (A) Negative    Bilirubin, UA Negative Negative    Blood, UA Negative Negative    Protein, UA 30 mg/dL (1+) (A) Negative    Leuk Esterase, UA Negative Negative    Nitrite, UA Negative Negative    Urobilinogen, UA 1.0 E.U./dL 0.2 - 1.0 E.U./dL   Urinalysis, Microscopic Only - Urine, Clean Catch    Collection Time: 03/10/25  9:05 AM    Specimen: Urine, Clean Catch   Result Value Ref Range    RBC, UA 0-2 None Seen, 0-2 /HPF    WBC, UA 3-5 (A) None Seen, 0-2 /HPF    Bacteria, UA None Seen None Seen /HPF    Squamous Epithelial Cells, UA 0-2 None Seen, 0-2 /HPF    Hyaline Casts, UA 0-2 None Seen /LPF    Methodology Automated Microscopy    S. Pneumo Ag Urine or CSF - Urine, Urine, Clean Catch    Collection Time: 03/10/25  9:05 AM    Specimen: Urine, Clean Catch   Result Value Ref Range    Strep Pneumo Ag Negative Negative   Legionella Antigen, Urine - Urine, Urine, Clean Catch    Collection Time: 03/10/25  9:05 AM    Specimen: Urine, Clean Catch   Result Value Ref Range    LEGIONELLA ANTIGEN, URINE Negative Negative   Respiratory Panel PCR w/COVID-19(SARS-CoV-2) L[facility]/JESSA/JORDAN/PAD/COR/SUE In-House, NP Swab in UTM/VTM, 2 HR TAT - Swab, Nasopharynx    Collection Time: 03/10/25 10:40 AM    Specimen: Nasopharynx; Swab   Result  Value Ref Range    ADENOVIRUS, PCR Not Detected Not Detected    Coronavirus 229E Not Detected Not Detected    Coronavirus HKU1 Not Detected Not Detected    Coronavirus NL63 Not Detected Not Detected    Coronavirus OC43 Not Detected Not Detected    COVID19 Not Detected Not Detected - Ref. Range    Human Metapneumovirus Not Detected Not Detected    Human Rhinovirus/Enterovirus Not Detected Not Detected    Influenza A PCR Not Detected Not Detected    Influenza B PCR Not Detected Not Detected    Parainfluenza Virus 1 Not Detected Not Detected    Parainfluenza Virus 2 Not Detected Not Detected    Parainfluenza Virus 3 Not Detected Not Detected    Parainfluenza Virus 4 Not Detected Not Detected    RSV, PCR Not Detected Not Detected    Bordetella pertussis pcr Not Detected Not Detected    Bordetella parapertussis PCR Not Detected Not Detected    Chlamydophila pneumoniae PCR Not Detected Not Detected    Mycoplasma pneumo by PCR Not Detected Not Detected       Radiology  XR Chest 2 View  Result Date: 3/10/2025  XR CHEST 2 VW-   INDICATION: Cough, influenza  COMPARISON: Chest radiograph Malissa 15, 2022  TECHNIQUE: 2 view chest  FINDINGS:  Ill-defined multifocal bilateral lung opacities. No effusions. Stable mediastinum. Heart is normal in size.      Ill-defined multifocal bilateral lung opacities. Suspect multifocal pneumonia. Can be followed to resolution.  This report was finalized on 3/10/2025 10:40 AM by Dr. Benjamin Villeda M.D on Workstation: [id number]      CT Abdomen Pelvis With Contrast  Result Date: 3/10/2025  CT ABDOMEN PELVIS W CONTRAST-  DATE OF EXAM: 3/10/2025 9:46 AM  INDICATION: vomiting, diarrhea,  x4 days leukocytosis.  COMPARISON: Chest radiographs 3/10/2025 and 6/15/2022. CT abdomen pelvis 11/20/2019.  TECHNIQUE: Multiple contiguous axial images were acquired through the abdomen and pelvis following the intravenous administration of 85 mL of Isovue-300. Reformatted coronal and sagittal sequences were also  reviewed. Radiation dose reduction techniques were utilized, including automated exposure control and exposure modulation based on body size.  FINDINGS: Respiratory motion artifact mild limits evaluation of the lung bases. Multifocal patchy and nodular consolidation in each lung base with scattered groundglass opacities and interstitial thickening, likely pneumonia.  The liver, gallbladder, spleen, pancreas, adrenal glands, and kidneys are unremarkable. The urinary bladder is nondistended. Urinary bladder wall thickening is likely accentuated by underdistention.  Minimal colorectal stool. Small volume fluid in the lumen of the nondilated cecum and ascending colon likely reflects the clinical symptom of diarrhea. No bowel obstruction or significant bowel wall thickening. The appendix is normal.  No free fluid in the abdomen or pelvis. No free intraperitoneal air. No pathologically enlarged lymph nodes in the abdomen or pelvis. Transitional lumbosacral vertebral anatomy. No acute osseous abnormality or concerning osseous lesion.       1. Partially imaged multifocal lung opacities in each lung base, consistent with diffuse pneumonia. 2. Small volume fluid in the lumen of the nondilated cecum and ascending colon likely reflects the clinical symptom of diarrhea.  This report was finalized on 3/10/2025 10:34 AM by Juan C Arnett MD on Workstation: [id number]        Medical Decision Making:  ED Course as of 03/10/25 1836   Mon Mar 10, 2025   0935 WBC(!): 19.41 [KA]   0936 Hemoglobin: 15.2 [KA]   1031 Glucose(!): 102 [KA]   1031 Creatinine: 0.81 [KA]   1031 Potassium(!): 3.2 [KA]   1031 Lipase(!): 10 [KA]   1031 Lactate: 1.7 [KA]   1031 Nitrite, UA: Negative [KA]   1031 Leukocytes, UA: Negative [KA]   1031 Blood, UA: Negative [KA]   1211 I discussed patient with Dr. Javier, hospitalist including history presentation workup and he agrees to admit. [KA]      ED Course User Index  [KA] Patricia Shaffer PA-C       MDM: The  differential diagnosis for generalized weakness and illness  is quite broad and includes but is not limited to: hypoglycemia, orthostasis, arrhythmia, ACS, PE, electrolyte disturbances, dka, renal failure, profound anemia, gi bleeding, intoxication, myasthenia gravis, sepsis, and medication effects--among other possibilities.    Procedures:  Procedures        PPE: I followed hospital protocols for proper PPE based on patient presentation including use of N95 mask for suspected infectious respiratory conditions.  Proper hand hygiene was performed both before and after the patient encounter.          Diagnosis  Final diagnoses:   Multifocal pneumonia   Nausea vomiting and diarrhea       Note Disclaimer: At [facility], we believe that sharing information builds trust and better relationships. You are receiving this note because you recently visited [facility]. It is possible you will see health information before a provider has talked with you about it. This kind of information can be easy to misunderstand. To help you fully understand what it means for your health, we urge you to discuss this note with your provider.       Maile Quiles MD  03/11/25 [id number]

## 2025-03-11 LAB
ANION GAP SERPL CALCULATED.3IONS-SCNC: 12.3 MMOL/L (ref 5–15)
BUN SERPL-MCNC: 5 MG/DL (ref 6–20)
BUN/CREAT SERPL: 9.1 (ref 7–25)
CALCIUM SPEC-SCNC: 8.9 MG/DL (ref 8.6–10.5)
CHLORIDE SERPL-SCNC: 103 MMOL/L (ref 98–107)
CO2 SERPL-SCNC: 24.7 MMOL/L (ref 22–29)
CREAT SERPL-MCNC: 0.55 MG/DL (ref 0.76–1.27)
EGFRCR SERPLBLD CKD-EPI 2021: 139.3 ML/MIN/1.73
GLUCOSE SERPL-MCNC: 117 MG/DL (ref 65–99)
POTASSIUM SERPL-SCNC: 3.4 MMOL/L (ref 3.5–5.2)
POTASSIUM SERPL-SCNC: 3.6 MMOL/L (ref 3.5–5.2)
POTASSIUM SERPL-SCNC: 3.7 MMOL/L (ref 3.5–5.2)
SODIUM SERPL-SCNC: 140 MMOL/L (ref 136–145)

## 2025-03-11 PROCEDURE — 25010000002 CEFTRIAXONE PER 250 MG: Performed by: STUDENT IN AN ORGANIZED HEALTH CARE EDUCATION/TRAINING PROGRAM

## 2025-03-11 PROCEDURE — 94799 UNLISTED PULMONARY SVC/PX: CPT

## 2025-03-11 PROCEDURE — 94640 AIRWAY INHALATION TREATMENT: CPT

## 2025-03-11 PROCEDURE — 25810000003 LACTATED RINGERS PER 1000 ML: Performed by: STUDENT IN AN ORGANIZED HEALTH CARE EDUCATION/TRAINING PROGRAM

## 2025-03-11 PROCEDURE — 94761 N-INVAS EAR/PLS OXIMETRY MLT: CPT

## 2025-03-11 PROCEDURE — G0378 HOSPITAL OBSERVATION PER HR: HCPCS

## 2025-03-11 PROCEDURE — 87081 CULTURE SCREEN ONLY: CPT | Performed by: NURSE PRACTITIONER

## 2025-03-11 PROCEDURE — 94664 DEMO&/EVAL PT USE INHALER: CPT

## 2025-03-11 PROCEDURE — 80048 BASIC METABOLIC PNL TOTAL CA: CPT | Performed by: NURSE PRACTITIONER

## 2025-03-11 PROCEDURE — 84132 ASSAY OF SERUM POTASSIUM: CPT | Performed by: STUDENT IN AN ORGANIZED HEALTH CARE EDUCATION/TRAINING PROGRAM

## 2025-03-11 RX ORDER — LOPERAMIDE HYDROCHLORIDE 2 MG/1
2 CAPSULE ORAL 4 TIMES DAILY PRN
Status: DISCONTINUED | OUTPATIENT
Start: 2025-03-11 | End: 2025-03-13 | Stop reason: HOSPADM

## 2025-03-11 RX ORDER — DEXTROMETHORPHAN POLISTIREX 30 MG/5ML
60 SUSPENSION ORAL EVERY 12 HOURS SCHEDULED
Status: DISCONTINUED | OUTPATIENT
Start: 2025-03-11 | End: 2025-03-13 | Stop reason: HOSPADM

## 2025-03-11 RX ORDER — POTASSIUM CHLORIDE 1500 MG/1
40 TABLET, EXTENDED RELEASE ORAL EVERY 4 HOURS
Status: COMPLETED | OUTPATIENT
Start: 2025-03-11 | End: 2025-03-11

## 2025-03-11 RX ADMIN — DOXYCYCLINE 100 MG: 100 INJECTION, POWDER, LYOPHILIZED, FOR SOLUTION INTRAVENOUS at 03:10

## 2025-03-11 RX ADMIN — POTASSIUM CHLORIDE 40 MEQ: 20 TABLET, EXTENDED RELEASE ORAL at 20:15

## 2025-03-11 RX ADMIN — DEXTROMETHORPHAN 60 MG: 30 SUSPENSION, EXTENDED RELEASE ORAL at 09:20

## 2025-03-11 RX ADMIN — GUAIFENESIN 600 MG: 600 TABLET, MULTILAYER, EXTENDED RELEASE ORAL at 05:22

## 2025-03-11 RX ADMIN — DOXYCYCLINE 100 MG: 100 INJECTION, POWDER, LYOPHILIZED, FOR SOLUTION INTRAVENOUS at 15:00

## 2025-03-11 RX ADMIN — POTASSIUM CHLORIDE 40 MEQ: 20 TABLET, EXTENDED RELEASE ORAL at 09:20

## 2025-03-11 RX ADMIN — LOPERAMIDE HYDROCHLORIDE 2 MG: 2 CAPSULE ORAL at 23:53

## 2025-03-11 RX ADMIN — BENZONATATE 100 MG: 100 CAPSULE ORAL at 20:18

## 2025-03-11 RX ADMIN — IPRATROPIUM BROMIDE AND ALBUTEROL SULFATE 3 ML: .5; 3 SOLUTION RESPIRATORY (INHALATION) at 07:39

## 2025-03-11 RX ADMIN — LOPERAMIDE HYDROCHLORIDE 2 MG: 2 CAPSULE ORAL at 12:51

## 2025-03-11 RX ADMIN — IPRATROPIUM BROMIDE AND ALBUTEROL SULFATE 3 ML: .5; 3 SOLUTION RESPIRATORY (INHALATION) at 19:26

## 2025-03-11 RX ADMIN — CEFTRIAXONE 2000 MG: 2 INJECTION, POWDER, FOR SOLUTION INTRAMUSCULAR; INTRAVENOUS at 12:50

## 2025-03-11 RX ADMIN — POTASSIUM CHLORIDE 40 MEQ: 20 TABLET, EXTENDED RELEASE ORAL at 23:53

## 2025-03-11 RX ADMIN — DEXTROMETHORPHAN 60 MG: 30 SUSPENSION, EXTENDED RELEASE ORAL at 20:15

## 2025-03-11 RX ADMIN — POTASSIUM CHLORIDE 40 MEQ: 20 TABLET, EXTENDED RELEASE ORAL at 12:51

## 2025-03-11 RX ADMIN — SODIUM CHLORIDE, SODIUM LACTATE, POTASSIUM CHLORIDE, CALCIUM CHLORIDE 100 ML/HR: 20; 30; 600; 310 INJECTION, SOLUTION INTRAVENOUS at 00:16

## 2025-03-11 RX ADMIN — CETIRIZINE HYDROCHLORIDE 10 MG: 10 TABLET, FILM COATED ORAL at 09:20

## 2025-03-11 RX ADMIN — GUAIFENESIN 600 MG: 600 TABLET, MULTILAYER, EXTENDED RELEASE ORAL at 17:56

## 2025-03-11 NOTE — PLAN OF CARE
Goal Outcome Evaluation:   During exertion, patient will become SOA and fatigued.

## 2025-03-11 NOTE — PLAN OF CARE
Goal Outcome Evaluation:      This patient remains alert and oriented x 4. He continues to have diarrhea stools. He has an occasional cough. He has not had any diarrhea. He has been given imodium once today. He can have it again at 1900 if he continues to have diarrhea stools. He seems overly anxious and distrusting. Julianna Pablo said he can be discharged when his diarrhea is controlled. His IV started to leak, so a new IV site was started.

## 2025-03-11 NOTE — PROGRESS NOTES
Name: Juancarlos Willingham ADMIT: 3/10/2025   : 1995  PCP: Lindsay Marroquin APRN    MRN: [id number] LOS: 0 days   AGE/SEX: 29 y.o. male  ROOM: [facility]     Subjective   Subjective   Mr Willingham reports on going diarrhea, states he is having bowel movements, sore throat and voice changes. He otherwise offers no complaints.        Objective   Objective   Vital Signs  Temp:  [98.1 °F (36.7 °C)-99.8 °F (37.7 °C)] 98.1 °F (36.7 °C)  Heart Rate:  [86-98] 98  Resp:  [18] 18  BP: (115-133)/(72-78) 124/72  SpO2:  [90 %-96 %] 95 %  on   ;   Device (Oxygen Therapy): room air  Body mass index is 27.35 kg/m².  Physical Exam  Vitals and nursing note reviewed.   Constitutional:       General: He is not in acute distress.     Appearance: Normal appearance. He is well-developed.   HENT:      Head: Normocephalic and atraumatic.      Mouth/Throat:      Mouth: Mucous membranes are dry.      Pharynx: Posterior oropharyngeal erythema present. No oropharyngeal exudate.   Eyes:      General: No scleral icterus.     Pupils: Pupils are equal, round, and reactive to light.   Neck:      Vascular: No JVD.   Cardiovascular:      Rate and Rhythm: Normal rate and regular rhythm.      Heart sounds: No murmur heard.  Pulmonary:      Effort: Pulmonary effort is normal. No respiratory distress.      Breath sounds: Normal breath sounds. No wheezing.   Abdominal:      General: Bowel sounds are normal. There is no distension.      Palpations: Abdomen is soft.      Tenderness: There is no abdominal tenderness.   Musculoskeletal:      Right lower leg: No edema.      Left lower leg: No edema.   Skin:     General: Skin is warm and dry.      Capillary Refill: Capillary refill takes less than 2 seconds.      Findings: No rash.   Neurological:      General: No focal deficit present.      Mental Status: He is alert and oriented to person, place, and time. Mental status is at baseline.   Psychiatric:         Mood and Affect: Mood normal.       Results  "Review     I reviewed the patient's new clinical results.  Results from last 7 days   Lab Units 03/10/25  0900   WBC 10*3/mm3 19.41*   HEMOGLOBIN g/dL 15.2   PLATELETS 10*3/mm3 471*     Results from last 7 days   Lab Units 03/11/25  0106 03/10/25  0900   SODIUM mmol/L  --  132*   POTASSIUM mmol/L 3.6 3.2*   CHLORIDE mmol/L  --  93*   CO2 mmol/L  --  21.3*   BUN mg/dL  --  6   CREATININE mg/dL  --  0.81   GLUCOSE mg/dL  --  102*   EGFR mL/min/1.73  --  123.9     Results from last 7 days   Lab Units 03/10/25  0900   ALBUMIN g/dL 4.2   BILIRUBIN mg/dL 1.0   ALK PHOS U/L 91   AST (SGOT) U/L 20   ALT (SGPT) U/L 27     Results from last 7 days   Lab Units 03/10/25  0900   CALCIUM mg/dL 9.4   ALBUMIN g/dL 4.2     Results from last 7 days   Lab Units 03/10/25  0900   PROCALCITONIN ng/mL 0.10   LACTATE mmol/L 1.7     No results found for: \"HGBA1C\", \"POCGLU\"    XR Chest 2 View  Result Date: 3/10/2025  Ill-defined multifocal bilateral lung opacities. Suspect multifocal pneumonia. Can be followed to resolution.  This report was finalized on 3/10/2025 10:40 AM by Dr. Benjamin Villeda M.D on Workstation: [id number]      CT Abdomen Pelvis With Contrast  Result Date: 3/10/2025   1. Partially imaged multifocal lung opacities in each lung base, consistent with diffuse pneumonia. 2. Small volume fluid in the lumen of the nondilated cecum and ascending colon likely reflects the clinical symptom of diarrhea.  This report was finalized on 3/10/2025 10:34 AM by Juan C Arnett MD on Workstation: [id number]        I have personally reviewed all medications:  Scheduled Medications  cefTRIAXone, 2,000 mg, Intravenous, Q24H  cetirizine, 10 mg, Oral, Daily  dextromethorphan polistirex ER, 60 mg, Oral, Q12H  doxycycline, 100 mg, Intravenous, Q12H  guaiFENesin, 600 mg, Oral, Q12H  ipratropium-albuterol, 3 mL, Nebulization, Q6H While Awake - RT  potassium chloride ER, 40 mEq, Oral, Q4H    Infusions  lactated ringers, 100 mL/hr, Last Rate: 100 " mL/hr (03/11/25 0016)    Diet  Diet: Regular/House; Fluid Consistency: Thin (IDDSI 0)    I have personally reviewed:  [x]  Laboratory   [x]  Microbiology   [x]  Radiology   [x]  EKG/Telemetry  [x]  Cardiology/Vascular   []  Pathology    []  Records       Assessment/Plan     Active Hospital Problems    Diagnosis  POA    **Multifocal pneumonia [J18.9]  Yes    Sepsis [A41.9]  Yes    Diarrhea [R19.7]  Yes    Nausea with vomiting [R11.2]  Yes    Hyponatremia [E87.1]  Yes    Hypokalemia [E87.6]  Yes    Allergic rhinitis [J30.9]  Yes    Asthma [J45.909]  Yes      Resolved Hospital Problems   No resolved problems to display.       29 y.o. male admitted with Multifocal pneumonia.    Multifocal pneumonia-after recent infection with influenza.  RVP negative for any acute viral illnesses  Continue ceftriaxone, doxycycline for community acquired pneumonia  Urine antigens are negative  Continues to have leukocytosis  Also reports sore throat, and voice changes will check strep culture  Continue supportive care-    Sepsis  Secondary to above  Normal lactate  Leukocytosis with left shift noted on a.m. CBC  Thrombocytosis-mild  He is currently afebrile, he was initially febrile with a Tmax of 101.2.  He was initially tachycardic, heart rate is within normal limits now.  He remains on room air  Blood cultures show no growth at 24 hours      Diarrhea/nausea  Reports ongoing for 1.5 weeks   GI PCR is negative for any acute viral infection  Continue antiemetics      Dehydration-suspected secondary to decreased oral intake, GI losses  Hyponatremia-hypovolemic will restart IV fluids  Hypokalemia  IV fluids, and potassium replaced yesterday  Repeat BMP now-will replace as needed.    Asthma  Chronic  Not in acute exacerbation  Remains on room air  Lungs are CTA          SCDs for DVT prophylaxis.  Full code.  Discussed with patient, family, care team on multidisciplinary rounds, and DR. Javier .  Anticipate discharge home in 1-2  days.  Expected Discharge Date: 3/11/2025; Expected Discharge Time:       FLORENCE Portillo  [facility]  03/11/25  10:54 EDT

## 2025-03-12 LAB
ANION GAP SERPL CALCULATED.3IONS-SCNC: 7.9 MMOL/L (ref 5–15)
BASOPHILS # BLD MANUAL: 0.1 10*3/MM3 (ref 0–0.2)
BASOPHILS NFR BLD MANUAL: 1 % (ref 0–1.5)
BUN SERPL-MCNC: 5 MG/DL (ref 6–20)
BUN/CREAT SERPL: 8.9 (ref 7–25)
C DIFF TOX GENS STL QL NAA+PROBE: NEGATIVE
CALCIUM SPEC-SCNC: 8.8 MG/DL (ref 8.6–10.5)
CHLORIDE SERPL-SCNC: 108 MMOL/L (ref 98–107)
CO2 SERPL-SCNC: 23.1 MMOL/L (ref 22–29)
CREAT SERPL-MCNC: 0.56 MG/DL (ref 0.76–1.27)
DEPRECATED RDW RBC AUTO: 39.2 FL (ref 37–54)
EGFRCR SERPLBLD CKD-EPI 2021: 138.5 ML/MIN/1.73
EOSINOPHIL # BLD MANUAL: 0 10*3/MM3 (ref 0–0.4)
EOSINOPHIL NFR BLD MANUAL: 0 % (ref 0.3–6.2)
ERYTHROCYTE [DISTWIDTH] IN BLOOD BY AUTOMATED COUNT: 12.2 % (ref 12.3–15.4)
GIANT PLATELETS: ABNORMAL
GLUCOSE SERPL-MCNC: 103 MG/DL (ref 65–99)
HCT VFR BLD AUTO: 38.2 % (ref 37.5–51)
HGB BLD-MCNC: 13.1 G/DL (ref 13–17.7)
LYMPHOCYTES # BLD MANUAL: 2.11 10*3/MM3 (ref 0.7–3.1)
LYMPHOCYTES NFR BLD MANUAL: 15 % (ref 5–12)
MCH RBC QN AUTO: 29.7 PG (ref 26.6–33)
MCHC RBC AUTO-ENTMCNC: 34.3 G/DL (ref 31.5–35.7)
MCV RBC AUTO: 86.6 FL (ref 79–97)
MONOCYTES # BLD: 1.44 10*3/MM3 (ref 0.1–0.9)
NEUTROPHILS # BLD AUTO: 5.96 10*3/MM3 (ref 1.7–7)
NEUTROPHILS NFR BLD MANUAL: 62 % (ref 42.7–76)
NRBC BLD AUTO-RTO: 0 /100 WBC (ref 0–0.2)
PLATELET # BLD AUTO: 471 10*3/MM3 (ref 140–450)
PMV BLD AUTO: 10.5 FL (ref 6–12)
POTASSIUM SERPL-SCNC: 3.8 MMOL/L (ref 3.5–5.2)
POTASSIUM SERPL-SCNC: 3.8 MMOL/L (ref 3.5–5.2)
RBC # BLD AUTO: 4.41 10*6/MM3 (ref 4.14–5.8)
RBC MORPH BLD: NORMAL
S PYO AG THROAT QL: NEGATIVE
SODIUM SERPL-SCNC: 139 MMOL/L (ref 136–145)
VARIANT LYMPHS NFR BLD MANUAL: 22 % (ref 19.6–45.3)
WBC MORPH BLD: NORMAL
WBC NRBC COR # BLD AUTO: 9.61 10*3/MM3 (ref 3.4–10.8)

## 2025-03-12 PROCEDURE — 87177 OVA AND PARASITES SMEARS: CPT | Performed by: NURSE PRACTITIONER

## 2025-03-12 PROCEDURE — 85025 COMPLETE CBC W/AUTO DIFF WBC: CPT | Performed by: NURSE PRACTITIONER

## 2025-03-12 PROCEDURE — 87493 C DIFF AMPLIFIED PROBE: CPT | Performed by: NURSE PRACTITIONER

## 2025-03-12 PROCEDURE — 87880 STREP A ASSAY W/OPTIC: CPT | Performed by: NURSE PRACTITIONER

## 2025-03-12 PROCEDURE — 87209 SMEAR COMPLEX STAIN: CPT | Performed by: NURSE PRACTITIONER

## 2025-03-12 PROCEDURE — 85007 BL SMEAR W/DIFF WBC COUNT: CPT | Performed by: NURSE PRACTITIONER

## 2025-03-12 PROCEDURE — 84132 ASSAY OF SERUM POTASSIUM: CPT | Performed by: STUDENT IN AN ORGANIZED HEALTH CARE EDUCATION/TRAINING PROGRAM

## 2025-03-12 PROCEDURE — 25010000002 CEFTRIAXONE PER 250 MG: Performed by: STUDENT IN AN ORGANIZED HEALTH CARE EDUCATION/TRAINING PROGRAM

## 2025-03-12 PROCEDURE — G0378 HOSPITAL OBSERVATION PER HR: HCPCS

## 2025-03-12 PROCEDURE — 87081 CULTURE SCREEN ONLY: CPT | Performed by: NURSE PRACTITIONER

## 2025-03-12 PROCEDURE — 80048 BASIC METABOLIC PNL TOTAL CA: CPT | Performed by: NURSE PRACTITIONER

## 2025-03-12 RX ADMIN — BENZONATATE 100 MG: 100 CAPSULE ORAL at 12:44

## 2025-03-12 RX ADMIN — DEXTROMETHORPHAN 60 MG: 30 SUSPENSION, EXTENDED RELEASE ORAL at 08:36

## 2025-03-12 RX ADMIN — GUAIFENESIN 600 MG: 600 TABLET, MULTILAYER, EXTENDED RELEASE ORAL at 17:45

## 2025-03-12 RX ADMIN — CETIRIZINE HYDROCHLORIDE 10 MG: 10 TABLET, FILM COATED ORAL at 08:36

## 2025-03-12 RX ADMIN — LOPERAMIDE HYDROCHLORIDE 2 MG: 2 CAPSULE ORAL at 08:36

## 2025-03-12 RX ADMIN — DOXYCYCLINE 100 MG: 100 INJECTION, POWDER, LYOPHILIZED, FOR SOLUTION INTRAVENOUS at 03:17

## 2025-03-12 RX ADMIN — DEXTROMETHORPHAN 60 MG: 30 SUSPENSION, EXTENDED RELEASE ORAL at 20:46

## 2025-03-12 RX ADMIN — DOXYCYCLINE 100 MG: 100 INJECTION, POWDER, LYOPHILIZED, FOR SOLUTION INTRAVENOUS at 15:32

## 2025-03-12 RX ADMIN — BENZONATATE 100 MG: 100 CAPSULE ORAL at 03:32

## 2025-03-12 RX ADMIN — GUAIFENESIN 600 MG: 600 TABLET, MULTILAYER, EXTENDED RELEASE ORAL at 06:46

## 2025-03-12 RX ADMIN — AVOBENZONE, HOMOSALATE, OCTISALATE, OCTOCRYLENE, AND OXYBENZONE 1 PACKET: 29.4; 147; 49; 25.4; 58.8 LOTION TOPICAL at 15:32

## 2025-03-12 RX ADMIN — CEFTRIAXONE 2000 MG: 2 INJECTION, POWDER, FOR SOLUTION INTRAMUSCULAR; INTRAVENOUS at 12:27

## 2025-03-12 NOTE — PLAN OF CARE
Goal Outcome Evaluation:    Problem: Adult Inpatient Plan of Care  Goal: Absence of Hospital-Acquired Illness or Injury  Intervention: Prevent Infection  Recent Flowsheet Documentation  Taken 3/12/2025 1601 by Sidra Roman RN  Infection Prevention: single patient room provided  Taken 3/12/2025 1420 by Sidra Roman RN  Infection Prevention: rest/sleep promoted  Taken 3/12/2025 1220 by Sidra Roman RN  Infection Prevention: single patient room provided  Taken 3/12/2025 1017 by Sidra Roman RN  Infection Prevention: single patient room provided  Taken 3/12/2025 0805 by Sidra Roman RN  Infection Prevention: single patient room provided   Plan of Care Reviewed With: patient alert, room air, started him on metamucil, still on IV ABX, denies pain.

## 2025-03-12 NOTE — PROGRESS NOTES
Name: Juancarlos Willingham ADMIT: 3/10/2025   : 1995  PCP: Lindsay Marroquinjoao [place]    MRN: [id number] LOS: 0 days   AGE/SEX: 29 y.o. male  ROOM: [facility]     Subjective   Subjective   He continues to have diarrhea, over night,. His throat is still sore and hoarse. He otherwise denies any acute distress, and is tolerating oral diet well.       Objective   Objective   Vital Signs  Temp:  [98.2 °F (36.8 °C)-99 °F (37.2 °C)] 98.2 °F (36.8 °C)  Heart Rate:  [] 91  Resp:  [16-18] 18  BP: (114-135)/(65-81) 135/81  SpO2:  [93 %-98 %] 97 %  on   ;   Device (Oxygen Therapy): room air  Body mass index is 27.38 kg/m².  Physical Exam  Vitals and nursing note reviewed.   Constitutional:       Appearance: Normal appearance.   HENT:      Head: Atraumatic.      Nose: Nose normal.      Mouth/Throat:      Mouth: Mucous membranes are dry.   Eyes:      Conjunctiva/sclera: Conjunctivae normal.   Cardiovascular:      Rate and Rhythm: Normal rate and regular rhythm.      Pulses: Normal pulses.           Radial pulses are 2+ on the right side and 2+ on the left side.        Dorsalis pedis pulses are 2+ on the right side and 2+ on the left side.        Posterior tibial pulses are 2+ on the right side and 2+ on the left side.      Heart sounds: Normal heart sounds.   Pulmonary:      Effort: Pulmonary effort is normal.      Breath sounds: Normal breath sounds.   Abdominal:      General: Bowel sounds are normal.      Palpations: Abdomen is soft.   Musculoskeletal:         General: Normal range of motion.      Right lower leg: No edema.      Left lower leg: No edema.   Skin:     General: Skin is warm and dry.      Capillary Refill: Capillary refill takes less than 2 seconds.   Neurological:      General: No focal deficit present.      Mental Status: He is alert and oriented to person, place, and time. Mental status is at baseline.   Psychiatric:         Mood and Affect: Mood normal.       Results Review     I reviewed the  "patient's new clinical results.  Results from last 7 days   Lab Units 03/12/25  0422 03/10/25  0900   WBC 10*3/mm3 9.61 19.41*   HEMOGLOBIN g/dL 13.1 15.2   PLATELETS 10*3/mm3 471* 471*     Results from last 7 days   Lab Units 03/12/25 0422 03/11/25 2001 03/11/25  1226 03/11/25  0106 03/10/25  0900   SODIUM mmol/L 139  --  140  --  132*   POTASSIUM mmol/L 3.8  3.8 3.7 3.4* 3.6 3.2*   CHLORIDE mmol/L 108*  --  103  --  93*   CO2 mmol/L 23.1  --  24.7  --  21.3*   BUN mg/dL 5*  --  5*  --  6   CREATININE mg/dL 0.56*  --  0.55*  --  0.81   GLUCOSE mg/dL 103*  --  117*  --  102*   EGFR mL/min/1.73 138.5  --  139.3  --  123.9     Results from last 7 days   Lab Units 03/10/25  0900   ALBUMIN g/dL 4.2   BILIRUBIN mg/dL 1.0   ALK PHOS U/L 91   AST (SGOT) U/L 20   ALT (SGPT) U/L 27     Results from last 7 days   Lab Units 03/12/25 0422 03/11/25 1226 03/10/25  0900   CALCIUM mg/dL 8.8 8.9 9.4   ALBUMIN g/dL  --   --  4.2     Results from last 7 days   Lab Units 03/10/25  0900   PROCALCITONIN ng/mL 0.10   LACTATE mmol/L 1.7     No results found for: \"HGBA1C\", \"POCGLU\"    No radiology results for the last day    I have personally reviewed all medications:  Scheduled Medications  cefTRIAXone, 2,000 mg, Intravenous, Q24H  cetirizine, 10 mg, Oral, Daily  dextromethorphan polistirex ER, 60 mg, Oral, Q12H  doxycycline, 100 mg, Intravenous, Q12H  guaiFENesin, 600 mg, Oral, Q12H  ipratropium-albuterol, 3 mL, Nebulization, Q6H While Awake - RT    Infusions  lactated ringers, 100 mL/hr, Last Rate: 100 mL/hr (03/11/25 0016)    Diet  Diet: Regular/House; Fluid Consistency: Thin (IDDSI 0)    I have personally reviewed:  [x]  Laboratory   [x]  Microbiology   [x]  Radiology   [x]  EKG/Telemetry  [x]  Cardiology/Vascular   []  Pathology    []  Records       Assessment/Plan     Active Hospital Problems    Diagnosis  POA    **Multifocal pneumonia [J18.9]  Yes    Sepsis [A41.9]  Yes    Diarrhea [R19.7]  Yes    Nausea with vomiting [R11.2]  " Yes    Hyponatremia [E87.1]  Yes    Hypokalemia [E87.6]  Yes    Allergic rhinitis [J30.9]  Yes    Asthma [J45.909]  Yes      Resolved Hospital Problems   No resolved problems to display.       29 y.o. male admitted with Multifocal pneumonia.     Multifocal pneumonia-after recent infection with influenza.  RVP negative for any acute viral illnesses  Continue ceftriaxone, doxycycline for community acquired pneumonia  Urine antigens are negative  Also reports sore throat, and voice changes will check strep culture  Continue supportive care-  WBC improved over night   Tachycardia has improved        Sepsis  Secondary to above  Normal lactate  Leukocytosis with left shift noted on a.m. CBC  Thrombocytosis-mild  He is currently afebrile, he was initially febrile with a Tmax of 101.2.  He was initially tachycardic, heart rate is within normal limits now.  He remains on room air  Blood cultures show no growth at 24 hours  Rapid strep swab is negative-platelets-hoarseness-strep culture pending     Diarrhea/nausea  Reports ongoing for 1.5 weeks   GI PCR is negative for any acute viral infection  Continue antiemetics  C. difficile checked negative  Continue Imodium  Start Metamucil to try to help bulk up stool today  He may need GI consult if not successful  Stool  Is tolerating regular diet     Dehydration-suspected secondary to decreased oral intake, GI losses  Hyponatremia-hypovolemic will restart IV fluids  Hypokalemia  IV fluids, and potassium replaced yesterday  Repeat BMP now-will replace as needed.     Asthma  Chronic  Not in acute exacerbation  Remains on room air  Lungs are CTA        SCDs for DVT prophylaxis.  Full code.  Discussed with patient, nursing staff, and care team on multidisciplinary rounds.  Anticipate discharge home in 1-2 days.  Expected Discharge Date: 3/11/2025; Expected Discharge Time:       FLORENCE Portillo  [facility]  03/12/25  15:06 EDT

## 2025-03-13 VITALS
OXYGEN SATURATION: 98 % | BODY MASS INDEX: 27.32 KG/M2 | SYSTOLIC BLOOD PRESSURE: 119 MMHG | HEART RATE: 81 BPM | RESPIRATION RATE: 18 BRPM | HEIGHT: 70 IN | DIASTOLIC BLOOD PRESSURE: 75 MMHG | WEIGHT: 190.8 LBS | TEMPERATURE: 99.1 F

## 2025-03-13 PROBLEM — J18.9 PNEUMONIA, UNSPECIFIED ORGANISM: Status: ACTIVE | Noted: 2025-03-13

## 2025-03-13 PROBLEM — D75.839 THROMBOCYTOSIS: Status: ACTIVE | Noted: 2025-03-13

## 2025-03-13 LAB
ANION GAP SERPL CALCULATED.3IONS-SCNC: 11 MMOL/L (ref 5–15)
BACTERIA SPEC AEROBE CULT: NORMAL
BASOPHILS # BLD MANUAL: 0 10*3/MM3 (ref 0–0.2)
BASOPHILS NFR BLD MANUAL: 0 % (ref 0–1.5)
BUN SERPL-MCNC: 7 MG/DL (ref 6–20)
BUN/CREAT SERPL: 10.6 (ref 7–25)
CALCIUM SPEC-SCNC: 9 MG/DL (ref 8.6–10.5)
CHLORIDE SERPL-SCNC: 108 MMOL/L (ref 98–107)
CO2 SERPL-SCNC: 22 MMOL/L (ref 22–29)
CREAT SERPL-MCNC: 0.66 MG/DL (ref 0.76–1.27)
DEPRECATED RDW RBC AUTO: 38 FL (ref 37–54)
EGFRCR SERPLBLD CKD-EPI 2021: 131.8 ML/MIN/1.73
EOSINOPHIL # BLD MANUAL: 0.31 10*3/MM3 (ref 0–0.4)
EOSINOPHIL NFR BLD MANUAL: 3 % (ref 0.3–6.2)
ERYTHROCYTE [DISTWIDTH] IN BLOOD BY AUTOMATED COUNT: 12 % (ref 12.3–15.4)
GLUCOSE SERPL-MCNC: 87 MG/DL (ref 65–99)
HCT VFR BLD AUTO: 43 % (ref 37.5–51)
HGB BLD-MCNC: 14.7 G/DL (ref 13–17.7)
LYMPHOCYTES # BLD MANUAL: 2.07 10*3/MM3 (ref 0.7–3.1)
LYMPHOCYTES NFR BLD MANUAL: 11 % (ref 5–12)
MCH RBC QN AUTO: 29.6 PG (ref 26.6–33)
MCHC RBC AUTO-ENTMCNC: 34.2 G/DL (ref 31.5–35.7)
MCV RBC AUTO: 86.5 FL (ref 79–97)
MONOCYTES # BLD: 1.14 10*3/MM3 (ref 0.1–0.9)
NEUTROPHILS # BLD AUTO: 6.84 10*3/MM3 (ref 1.7–7)
NEUTROPHILS NFR BLD MANUAL: 66 % (ref 42.7–76)
NRBC BLD AUTO-RTO: 0 /100 WBC (ref 0–0.2)
O+P SPEC MICRO: NORMAL
O+P STL CONC: NORMAL
PLAT MORPH BLD: NORMAL
PLATELET # BLD AUTO: 586 10*3/MM3 (ref 140–450)
PMV BLD AUTO: 10.2 FL (ref 6–12)
POTASSIUM SERPL-SCNC: 3.7 MMOL/L (ref 3.5–5.2)
RBC # BLD AUTO: 4.97 10*6/MM3 (ref 4.14–5.8)
RBC MORPH BLD: NORMAL
SODIUM SERPL-SCNC: 141 MMOL/L (ref 136–145)
VARIANT LYMPHS NFR BLD MANUAL: 20 % (ref 19.6–45.3)
WBC MORPH BLD: NORMAL
WBC NRBC COR # BLD AUTO: 10.37 10*3/MM3 (ref 3.4–10.8)

## 2025-03-13 PROCEDURE — 80048 BASIC METABOLIC PNL TOTAL CA: CPT | Performed by: NURSE PRACTITIONER

## 2025-03-13 PROCEDURE — 85007 BL SMEAR W/DIFF WBC COUNT: CPT | Performed by: NURSE PRACTITIONER

## 2025-03-13 PROCEDURE — 85025 COMPLETE CBC W/AUTO DIFF WBC: CPT | Performed by: NURSE PRACTITIONER

## 2025-03-13 PROCEDURE — G0378 HOSPITAL OBSERVATION PER HR: HCPCS

## 2025-03-13 RX ORDER — GUAIFENESIN 600 MG/1
600 TABLET, EXTENDED RELEASE ORAL EVERY 12 HOURS SCHEDULED
Qty: 60 TABLET | Refills: 0 | Status: SHIPPED | OUTPATIENT
Start: 2025-03-13 | End: 2025-03-13

## 2025-03-13 RX ORDER — ONDANSETRON 4 MG/1
4 TABLET, ORALLY DISINTEGRATING ORAL EVERY 6 HOURS PRN
Qty: 15 TABLET | Refills: 0 | Status: SHIPPED | OUTPATIENT
Start: 2025-03-13 | End: 2025-03-13

## 2025-03-13 RX ORDER — DEXTROMETHORPHAN POLISTIREX 30 MG/5ML
60 SUSPENSION ORAL EVERY 12 HOURS SCHEDULED
Qty: 280 ML | Refills: 0 | Status: SHIPPED | OUTPATIENT
Start: 2025-03-13

## 2025-03-13 RX ORDER — LEVOFLOXACIN 500 MG/1
500 TABLET, FILM COATED ORAL DAILY
Qty: 3 TABLET | Refills: 0 | Status: SHIPPED | OUTPATIENT
Start: 2025-03-13 | End: 2025-03-16

## 2025-03-13 RX ORDER — GUAIFENESIN 600 MG/1
600 TABLET, EXTENDED RELEASE ORAL EVERY 12 HOURS SCHEDULED
Start: 2025-03-13

## 2025-03-13 RX ORDER — DEXTROMETHORPHAN POLISTIREX 30 MG/5ML
60 SUSPENSION ORAL EVERY 12 HOURS SCHEDULED
Qty: 280 ML | Refills: 0 | Status: SHIPPED | OUTPATIENT
Start: 2025-03-13 | End: 2025-03-13

## 2025-03-13 RX ORDER — DOXYCYCLINE 100 MG/1
100 CAPSULE ORAL 2 TIMES DAILY
Qty: 6 CAPSULE | Refills: 0 | Status: SHIPPED | OUTPATIENT
Start: 2025-03-13 | End: 2025-03-13

## 2025-03-13 RX ORDER — DOXYCYCLINE 100 MG/1
100 CAPSULE ORAL 2 TIMES DAILY
Qty: 6 CAPSULE | Refills: 0 | Status: SHIPPED | OUTPATIENT
Start: 2025-03-13 | End: 2025-03-16

## 2025-03-13 RX ORDER — LEVOFLOXACIN 500 MG/1
500 TABLET, FILM COATED ORAL DAILY
Qty: 3 TABLET | Refills: 0 | Status: SHIPPED | OUTPATIENT
Start: 2025-03-13 | End: 2025-03-13

## 2025-03-13 RX ORDER — LOPERAMIDE HYDROCHLORIDE 2 MG/1
2 CAPSULE ORAL 4 TIMES DAILY PRN
Qty: 30 CAPSULE | Refills: 0 | Status: SHIPPED | OUTPATIENT
Start: 2025-03-13

## 2025-03-13 RX ORDER — ONDANSETRON 4 MG/1
4 TABLET, ORALLY DISINTEGRATING ORAL EVERY 6 HOURS PRN
Qty: 15 TABLET | Refills: 0 | Status: SHIPPED | OUTPATIENT
Start: 2025-03-13

## 2025-03-13 RX ORDER — LOPERAMIDE HYDROCHLORIDE 2 MG/1
2 CAPSULE ORAL 4 TIMES DAILY PRN
Qty: 30 CAPSULE | Refills: 0 | Status: SHIPPED | OUTPATIENT
Start: 2025-03-13 | End: 2025-03-13

## 2025-03-13 RX ORDER — ALBUTEROL SULFATE 90 UG/1
2 INHALANT RESPIRATORY (INHALATION) EVERY 4 HOURS PRN
Qty: 6.7 G | Refills: 0 | Status: SHIPPED | OUTPATIENT
Start: 2025-03-13

## 2025-03-13 RX ADMIN — CETIRIZINE HYDROCHLORIDE 10 MG: 10 TABLET, FILM COATED ORAL at 08:27

## 2025-03-13 RX ADMIN — DEXTROMETHORPHAN 60 MG: 30 SUSPENSION, EXTENDED RELEASE ORAL at 08:27

## 2025-03-13 RX ADMIN — AVOBENZONE, HOMOSALATE, OCTISALATE, OCTOCRYLENE, AND OXYBENZONE 1 PACKET: 29.4; 147; 49; 25.4; 58.8 LOTION TOPICAL at 08:27

## 2025-03-13 RX ADMIN — BENZONATATE 100 MG: 100 CAPSULE ORAL at 03:25

## 2025-03-13 RX ADMIN — DOXYCYCLINE 100 MG: 100 INJECTION, POWDER, LYOPHILIZED, FOR SOLUTION INTRAVENOUS at 03:22

## 2025-03-13 RX ADMIN — GUAIFENESIN 600 MG: 600 TABLET, MULTILAYER, EXTENDED RELEASE ORAL at 05:24

## 2025-03-13 NOTE — CASE MANAGEMENT/SOCIAL WORK
Discharge Planning Assessment  [facility]     Patient Name: Juancarlos Willingham  MRN: [id number]  Today's Date: 3/13/2025    Admit Date: 3/10/2025    Plan: Home   Discharge Needs Assessment       Row Name 03/13/25 0852       Living Environment    People in Home spouse    Current Living Arrangements apartment    Potentially Unsafe Housing Conditions none    In the past 12 months has the electric, gas, oil, or water company threatened to shut off services in your home? No    Primary Care Provided by self    Provides Primary Care For no one    Family Caregiver if Needed spouse    Quality of Family Relationships supportive    Able to Return to Prior Arrangements yes       Resource/Environmental Concerns    Resource/Environmental Concerns none    Transportation Concerns none       Transportation Needs    In the past 12 months, has lack of transportation kept you from medical appointments or from getting medications? no    In the past 12 months, has lack of transportation kept you from meetings, work, or from getting things needed for daily living? No       Food Insecurity    Within the past 12 months, you worried that your food would run out before you got the money to buy more. Never true    Within the past 12 months, the food you bought just didn't last and you didn't have money to get more. Never true       Transition Planning    Patient/Family Anticipates Transition to home    Patient/Family Anticipated Services at Transition none    Transportation Anticipated family or friend will provide;car, drives self       Discharge Needs Assessment    Readmission Within the Last 30 Days no previous admission in last 30 days    Equipment Currently Used at Home nebulizer    Concerns to be Addressed denies needs/concerns at this time    Do you want help finding or keeping work or a job? I do not need or want help    Do you want help with school or training? For example, starting or completing job training or getting a high school  diploma, GED or equivalent No    Anticipated Changes Related to Illness none    Equipment Needed After Discharge none    Provided Post Acute Provider List? N/A    Provided Post Acute Provider Quality & Resource List? N/A                   Discharge Plan       Row Name 03/13/25 0854       Plan    Plan Home    Plan Comments S/W pt at bedside.  Facesheet info corrected.  Pt lives in a 1st floor apartment w/ his wife Shena, is IADLs and can drive.  He works full time. Pt's only home DME is a nebulizer.  No hx of HH or SNF. He denies troubel affording meds or basic needs. Pt plans to return home with his wife upon DC and denies any DC needs at this time.  CCP will continue to follow and assist if needed. ...........Angelina QUINTANA/ CRUZ                    Expected Discharge Date and Time       Expected Discharge Date Expected Discharge Time    Mar 13, 2025            Demographic Summary       Row Name 03/13/25 0852       General Information    Admission Type observation    Arrived From home    Referral Source admission list    Reason for Consult discharge planning    Preferred Language English                   Functional Status       Row Name 03/13/25 0852       Functional Status    Usual Activity Tolerance good    Current Activity Tolerance good       Functional Status, IADL    Medications independent    Meal Preparation independent;assistive person    Housekeeping independent;assistive person    Laundry independent;assistive person    Shopping independent;assistive person    If for any reason you need help with day-to-day activities such as bathing, preparing meals, shopping, managing finances, etc., do you get the help you need? I don't need any help       Mental Status    General Appearance WDL WDL       Employment/    Employment Status employed full-time                             Angelina Jimenes RN

## 2025-03-13 NOTE — PROGRESS NOTES
"Nutrition Services    Patient Name:  Juancarlos Willingham  YOB: 1995  MRN: [id number]  Admit Date:  3/10/2025    Assessment Date:  03/13/25    NUTRITION SCREENING      Reason for Encounter MST score 2+   Diagnosis/Problem Hx reviewed.    Pt admitted to [facility] with cough, congestion, worsening dyspnea, N/V, diarrhea x 1 week PTA. Admitting dx of multifocal PNA. Recent treatment for influenza. Negative c diff. Metamucil initiated 3/12, imodium in place. Pt discharged prior to in person visit.        PO Diet Diet: Regular/House; Fluid Consistency: Thin (IDDSI 0)   Supplements n/a   PO Intake % No intake recorded       Medications MAR reviewed by RD   Labs  Listed below, reviewed   Physical Findings No documented edema    Room air   GI Function Diarrhea 3/12   Skin Status Intact        Height  Weight  BMI  Weight Trend     Height: 177.8 cm (70\")  Weight: 86.5 kg (190 lb 12.8 oz) (03/12/25 0650)  Body mass index is 27.38 kg/m².  Loss, Amount/Timeframe: 15.6% wt loss x <1 month using stated wt in February and standing scale wt this admission.       Nutrition Problem (PES) Unintentional wt loss related to decreased nutrient absorption with diarrhea as evidenced by > 5% wt loss x 1 month.        Intervention/Plan Continue current diet as tolerated.     RD to follow up per protocol.     Results from last 7 days   Lab Units 03/13/25  0519 03/12/25  0422 03/11/25 2001 03/11/25  1226 03/11/25  0106 03/10/25  0900   SODIUM mmol/L 141 139  --  140  --  132*   POTASSIUM mmol/L 3.7 3.8  3.8 3.7 3.4*   < > 3.2*   CHLORIDE mmol/L 108* 108*  --  103  --  93*   CO2 mmol/L 22.0 23.1  --  24.7  --  21.3*   BUN mg/dL 7 5*  --  5*  --  6   CREATININE mg/dL 0.66* 0.56*  --  0.55*  --  0.81   CALCIUM mg/dL 9.0 8.8  --  8.9  --  9.4   BILIRUBIN mg/dL  --   --   --   --   --  1.0   ALK PHOS U/L  --   --   --   --   --  91   ALT (SGPT) U/L  --   --   --   --   --  27   AST (SGOT) U/L  --   --   --   --   --  20   GLUCOSE mg/dL 87 103*  " "--  117*  --  102*    < > = values in this interval not displayed.     Results from last 7 days   Lab Units 03/13/25  0519   HEMOGLOBIN g/dL 14.7   HEMATOCRIT % 43.0     No results found for: \"HGBA1C\"      Electronically signed by:  Leslie Woodard RD  03/13/25 07:36 EDT    "

## 2025-03-13 NOTE — DISCHARGE SUMMARY
Patient Name: Juancarlos Willingham  : 1995  MRN: [id number]    Date of Admission: 3/10/2025  Date of Discharge:  3/13/2025  Primary Care Physician: Lindsay Marroquin APRN      Chief Complaint:   Influenza, Diarrhea, and Headache      Discharge Diagnoses     Active Hospital Problems    Diagnosis  POA    **Multifocal pneumonia [J18.9]  Yes    Thrombocytosis [D75.839]  Yes    Pneumonia, unspecified organism [J18.9]  Yes    Sepsis [A41.9]  Yes    Diarrhea [R19.7]  Yes    Nausea with vomiting [R11.2]  Yes    Hyponatremia [E87.1]  Yes    Hypokalemia [E87.6]  Yes    Allergic rhinitis [J30.9]  Yes    Asthma [J45.909]  Yes      Resolved Hospital Problems   No resolved problems to display.        Hospital Course     Mr. Willingham is a 29 y.o. male with a history of asthma, allergic rhinitis and recent influenza treated with  who presented to [facility] initially complaining of a 1 week history of cough, congestion, shortness of breath, sore throat, vomiting and diarrhea.  Please see the admitting history and physical for further details.  He was found to have positive SIRS screening with fever, tachycardia leukocytosis, imaging consistent with multifocal pneumonia.  And was admitted to the hospital for further evaluation and treatment.  He was also noted to be dehydrated with hypokalemic. He was administered IV fluid bolus, electrolytes were replaced followed by IV fluid infusions, DuoNebs for shortness of breath/wheezing started on doxycycline, and ceftriaxone for suspected community acquired pneumonia treatment.  This pneumonia improved significantly over first night, unfortunately he continued to have severe diarrhea.  After GI PCR was negative for acute viral illness he was started on Imodium with only minimal improvement. C Diff PCR was also negative.  Started on Metamucil that he reports his bowel movements are still loose, but not water as  previously. Ova parasite exam is still pending at time  of discharge.  Otherwise patient is medically stable for discharge home.  Been advised if antibiotic changes are necessary after open parasite examination results he will be called, and medications sent to his pharmacy.   He is medically stable for discharge home today with close follow up with his PCP in one week with CBC and BMP recommended at that time to guide current and on going treatment. He has been advised to drink plenty of fluids to avoid becoming dehydrated again.   He is being sent home on oral antibiotics, Levaquin, and doxycycline.due to known penicillin and cephalosporin allergy.  He will also be discharged with as needed OTC psyllium.   He has been advised he needs to take all medications as they have been prescribed.   He has been told to return to the emergency room if he was to have new or worsening symptoms.       Day of Discharge     Subjective:  He is very eager to be discharged home today, he verbalizes understanding of discharge instructions.  He reports he feels much better, with slowed and more consistent bowel movements today.      Physical Exam:  Temp:  [98.2 °F (36.8 °C)-98.8 °F (37.1 °C)] 98.2 °F (36.8 °C)  Heart Rate:  [75-96] 76  Resp:  [18] 18  BP: (100-135)/(59-81) 100/67  Body mass index is 27.38 kg/m².  Physical Exam  Vitals reviewed.   Constitutional:       General: He is not in acute distress.     Appearance: He is well-developed.   HENT:      Head: Normocephalic and atraumatic.      Mouth/Throat:      Mouth: Mucous membranes are dry.      Pharynx: No oropharyngeal exudate.   Eyes:      General: No scleral icterus.     Pupils: Pupils are equal, round, and reactive to light.   Neck:      Vascular: No JVD.   Cardiovascular:      Rate and Rhythm: Normal rate and regular rhythm.      Pulses: Normal pulses.           Radial pulses are 2+ on the right side and 2+ on the left side.        Dorsalis pedis pulses are 2+ on the right side and 2+ on the left side.        Posterior tibial  pulses are 2+ on the right side and 2+ on the left side.      Heart sounds: Normal heart sounds. No murmur heard.  Pulmonary:      Effort: Pulmonary effort is normal. No respiratory distress.      Breath sounds: Normal breath sounds. No wheezing.   Abdominal:      General: Bowel sounds are normal. There is no distension.      Palpations: Abdomen is soft.      Tenderness: There is no abdominal tenderness.   Musculoskeletal:      Right lower leg: No edema.      Left lower leg: No edema.   Skin:     General: Skin is warm and dry.      Capillary Refill: Capillary refill takes less than 2 seconds.      Findings: No rash.   Neurological:      General: No focal deficit present.      Mental Status: He is alert and oriented to person, place, and time.   Psychiatric:         Mood and Affect: Mood normal.         Consultants     Consult Orders (all) (From admission, onward)       Start     Ordered    03/10/25 1547  Inpatient Case Management  Consult  Once        Provider:  (Not yet assigned)    03/10/25 1547    03/10/25 1157  [facility] (on-call MD unless specified) Details  Once        Specialty:  Hospitalist  Provider:  (Not yet assigned)    03/10/25 1156                  Procedures     * Surgery not found *    Imaging Results (All)       Procedure Component Value Units Date/Time    XR Chest 2 View [[id number]] Collected: 03/10/25 1039     Updated: 03/10/25 1043    Narrative:      XR CHEST 2 VW-        INDICATION: Cough, influenza     COMPARISON: Chest radiograph Malissa 15, 2022     TECHNIQUE: 2 view chest     FINDINGS:      Ill-defined multifocal bilateral lung opacities. No effusions. Stable  mediastinum. Heart is normal in size.       Impression:      Ill-defined multifocal bilateral lung opacities. Suspect multifocal  pneumonia. Can be followed to resolution.     This report was finalized on 3/10/2025 10:40 AM by Dr. Benjamin Villeda M.D on Workstation: [id number]       CT Abdomen Pelvis With Contrast [[id number]]  Collected: 03/10/25 1027     Updated: 03/10/25 1037    Narrative:      CT ABDOMEN PELVIS W CONTRAST-     DATE OF EXAM: 3/10/2025 9:46 AM     INDICATION: vomiting, diarrhea,  x4 days leukocytosis.     COMPARISON: Chest radiographs 3/10/2025 and 6/15/2022. CT abdomen pelvis  11/20/2019.     TECHNIQUE: Multiple contiguous axial images were acquired through the  abdomen and pelvis following the intravenous administration of 85 mL of  Isovue-300. Reformatted coronal and sagittal sequences were also  reviewed. Radiation dose reduction techniques were utilized, including  automated exposure control and exposure modulation based on body size.     FINDINGS:  Respiratory motion artifact mild limits evaluation of the lung bases.  Multifocal patchy and nodular consolidation in each lung base with  scattered groundglass opacities and interstitial thickening, likely  pneumonia.     The liver, gallbladder, spleen, pancreas, adrenal glands, and kidneys  are unremarkable. The urinary bladder is nondistended. Urinary bladder  wall thickening is likely accentuated by underdistention.     Minimal colorectal stool. Small volume fluid in the lumen of the  nondilated cecum and ascending colon likely reflects the clinical  symptom of diarrhea. No bowel obstruction or significant bowel wall  thickening. The appendix is normal.     No free fluid in the abdomen or pelvis. No free intraperitoneal air. No  pathologically enlarged lymph nodes in the abdomen or pelvis.  Transitional lumbosacral vertebral anatomy. No acute osseous abnormality  or concerning osseous lesion.       Impression:         1. Partially imaged multifocal lung opacities in each lung base,  consistent with diffuse pneumonia.  2. Small volume fluid in the lumen of the nondilated cecum and ascending  colon likely reflects the clinical symptom of diarrhea.     This report was finalized on 3/10/2025 10:34 AM by Juan C Arnett MD on  Workstation: [id number]          "        Pertinent Labs     Results from last 7 days   Lab Units 03/13/25  0519 03/12/25  0422 03/10/25  0900   WBC 10*3/mm3 10.37 9.61 19.41*   HEMOGLOBIN g/dL 14.7 13.1 15.2   PLATELETS 10*3/mm3 586* 471* 471*     Results from last 7 days   Lab Units 03/13/25 0519 03/12/25 0422 03/11/25 2001 03/11/25  1226 03/11/25  0106 03/10/25  0900   SODIUM mmol/L 141 139  --  140  --  132*   POTASSIUM mmol/L 3.7 3.8  3.8 3.7 3.4*   < > 3.2*   CHLORIDE mmol/L 108* 108*  --  103  --  93*   CO2 mmol/L 22.0 23.1  --  24.7  --  21.3*   BUN mg/dL 7 5*  --  5*  --  6   CREATININE mg/dL 0.66* 0.56*  --  0.55*  --  0.81   GLUCOSE mg/dL 87 103*  --  117*  --  102*   EGFR mL/min/1.73 131.8 138.5  --  139.3  --  123.9    < > = values in this interval not displayed.     Results from last 7 days   Lab Units 03/10/25  0900   ALBUMIN g/dL 4.2   BILIRUBIN mg/dL 1.0   ALK PHOS U/L 91   AST (SGOT) U/L 20   ALT (SGPT) U/L 27     Results from last 7 days   Lab Units 03/13/25 0519 03/12/25 0422 03/11/25  1226 03/10/25  0900   CALCIUM mg/dL 9.0 8.8 8.9 9.4   ALBUMIN g/dL  --   --   --  4.2     Results from last 7 days   Lab Units 03/10/25  0900   LIPASE U/L 10*             Invalid input(s): \"LDLCALC\"  Results from last 7 days   Lab Units 03/10/25  0904 03/10/25  0900   BLOODCX  No growth at 3 days No growth at 3 days     Results from last 7 days   Lab Units 03/10/25  1040   COVID19  Not Detected       Test Results Pending at Discharge     Pending Results       Procedure [Order ID] Specimen - Date/Time    Lactic Acid, Plasma [[id number]]     Specimen: Blood     Ova & Parasite Examination - Stool, Per Rectum [[id number]] Collected: 03/12/25 1152    Specimen: Stool from Per Rectum Updated: 03/12/25 1605    Procalcitonin [[id number]]     Specimen: Blood               Discharge Details        Discharge Medications        New Medications        Instructions Start Date   dextromethorphan polistirex ER 30 MG/5ML Suspension Extended Release oral " suspension  Commonly known as: DELSYM   60 mg, Oral, Every 12 Hours Scheduled      doxycycline 100 MG capsule  Commonly known as: VIBRAMYCIN   100 mg, Oral, 2 Times Daily      guaiFENesin 600 MG 12 hr tablet  Commonly known as: MUCINEX   600 mg, Oral, Every 12 Hours Scheduled      levoFLOXacin 500 MG tablet  Commonly known as: LEVAQUIN   500 mg, Oral, Daily      loperamide 2 MG capsule  Commonly known as: IMODIUM   2 mg, Oral, 4 Times Daily PRN      ondansetron ODT 4 MG disintegrating tablet  Commonly known as: ZOFRAN-ODT   4 mg, Oral, Every 6 Hours PRN      phenol 1.4 % liquid liquid  Commonly known as: CHLORASEPTIC   1 spray, Mouth/Throat, Every 2 Hours PRN      Psyllium 51.7 % packet  Commonly known as: METAMUCIL MULTIHEALTH FIBER   1 packet, Oral, Daily   Start Date: March 14, 2025            Continue These Medications        Instructions Start Date   albuterol sulfate  (90 Base) MCG/ACT inhaler  Commonly known as: PROVENTIL HFA;VENTOLIN HFA;PROAIR HFA   2 puffs, Inhalation, Every 4 Hours PRN      cetirizine 10 MG tablet  Commonly known as: zyrTEC   10 mg, Daily               Allergies   Allergen Reactions    Amoxicillin-Pot Clavulanate Swelling    Augmentin [Amoxicillin-Pot Clavulanate] Hives    Penicillins Swelling       Discharge Disposition:  Home or Self Care      Discharge Diet:  Diet Order   Procedures    Diet: Regular/House; Fluid Consistency: Thin (IDDSI 0)       Discharge Activity: As tolerated       CODE STATUS:    Code Status and Medical Interventions: CPR (Attempt to Resuscitate); Full   Ordered at: 03/10/25 1214     Code Status (Patient has no pulse and is not breathing):    CPR (Attempt to Resuscitate)     Medical Interventions (Patient has pulse or is breathing):    Full     Level Of Support Discussed With:    Patient       No future appointments.   Follow-up Information       Lindsay Marroquin, FLORENCE .    Specialty: Nurse Practitioner  Contact information:  [address]  [address] [place] [id number]  [phone]                             Time Spent on Discharge:  Greater than 30 minutes      FLORENCE Portillo  [facility]  03/13/25  10:59 EDT

## 2025-03-13 NOTE — CASE MANAGEMENT/SOCIAL WORK
Case Management Discharge Note      Final Note: Home    Provided Post Acute Provider List?: N/A  Provided Post Acute Provider Quality & Resource List?: N/A    Selected Continued Care - Discharged on 3/13/2025 Admission date: 3/10/2025 - Discharge disposition: Home or Self Care      Destination    No services have been selected for the patient.                Durable Medical Equipment    No services have been selected for the patient.                Dialysis/Infusion    No services have been selected for the patient.                Home Medical Care    No services have been selected for the patient.                Therapy    No services have been selected for the patient.                Community Resources    No services have been selected for the patient.                Community & DME    No services have been selected for the patient.                         Final Discharge Disposition Code: 01 - home or self-care

## 2025-03-14 LAB — BACTERIA SPEC AEROBE CULT: NORMAL

## 2025-03-15 LAB
BACTERIA SPEC AEROBE CULT: NORMAL
BACTERIA SPEC AEROBE CULT: NORMAL